# Patient Record
Sex: MALE | Race: BLACK OR AFRICAN AMERICAN | NOT HISPANIC OR LATINO | Employment: STUDENT | URBAN - METROPOLITAN AREA
[De-identification: names, ages, dates, MRNs, and addresses within clinical notes are randomized per-mention and may not be internally consistent; named-entity substitution may affect disease eponyms.]

---

## 2024-08-09 ENCOUNTER — HOSPITAL ENCOUNTER (EMERGENCY)
Facility: HOSPITAL | Age: 19
Discharge: HOME | End: 2024-08-09
Attending: EMERGENCY MEDICINE
Payer: MEDICAID

## 2024-08-09 VITALS
SYSTOLIC BLOOD PRESSURE: 147 MMHG | TEMPERATURE: 97 F | DIASTOLIC BLOOD PRESSURE: 83 MMHG | RESPIRATION RATE: 20 BRPM | HEART RATE: 94 BPM | OXYGEN SATURATION: 98 %

## 2024-08-09 DIAGNOSIS — T78.40XA ALLERGIC REACTION, INITIAL ENCOUNTER: Primary | ICD-10-CM

## 2024-08-09 PROCEDURE — 99291 CRITICAL CARE FIRST HOUR: CPT | Performed by: EMERGENCY MEDICINE

## 2024-08-09 PROCEDURE — 2500000004 HC RX 250 GENERAL PHARMACY W/ HCPCS (ALT 636 FOR OP/ED)

## 2024-08-09 PROCEDURE — 96375 TX/PRO/DX INJ NEW DRUG ADDON: CPT | Performed by: EMERGENCY MEDICINE

## 2024-08-09 PROCEDURE — 2500000004 HC RX 250 GENERAL PHARMACY W/ HCPCS (ALT 636 FOR OP/ED): Performed by: EMERGENCY MEDICINE

## 2024-08-09 PROCEDURE — 96374 THER/PROPH/DIAG INJ IV PUSH: CPT | Performed by: EMERGENCY MEDICINE

## 2024-08-09 RX ORDER — FAMOTIDINE 10 MG/ML
20 INJECTION INTRAVENOUS ONCE
Status: COMPLETED | OUTPATIENT
Start: 2024-08-09 | End: 2024-08-09

## 2024-08-09 RX ORDER — DIPHENHYDRAMINE HYDROCHLORIDE 50 MG/ML
INJECTION INTRAMUSCULAR; INTRAVENOUS
Status: COMPLETED
Start: 2024-08-09 | End: 2024-08-09

## 2024-08-09 RX ORDER — EPINEPHRINE 0.3 MG/.3ML
1 INJECTION INTRAMUSCULAR ONCE AS NEEDED
Qty: 1 EACH | Refills: 0 | Status: SHIPPED | OUTPATIENT
Start: 2024-08-09

## 2024-08-09 RX ORDER — DIPHENHYDRAMINE HYDROCHLORIDE 50 MG/ML
25 INJECTION INTRAMUSCULAR; INTRAVENOUS ONCE
Status: COMPLETED | OUTPATIENT
Start: 2024-08-09 | End: 2024-08-09

## 2024-08-09 RX ORDER — PREDNISONE 20 MG/1
40 TABLET ORAL DAILY
Qty: 8 TABLET | Refills: 0 | Status: SHIPPED | OUTPATIENT
Start: 2024-08-09 | End: 2024-08-13

## 2024-08-09 RX ORDER — EPINEPHRINE 0.3 MG/.3ML
0.3 INJECTION SUBCUTANEOUS ONCE
Status: COMPLETED | OUTPATIENT
Start: 2024-08-09 | End: 2024-08-09

## 2024-08-09 RX ORDER — RISPERIDONE 1 MG/1
2 TABLET ORAL 2 TIMES DAILY
COMMUNITY

## 2024-08-09 RX ORDER — FAMOTIDINE 10 MG/ML
INJECTION INTRAVENOUS
Status: COMPLETED
Start: 2024-08-09 | End: 2024-08-09

## 2024-08-09 RX ORDER — EPINEPHRINE 1 MG/ML
INJECTION INTRAMUSCULAR; INTRAVENOUS; SUBCUTANEOUS
Status: DISCONTINUED
Start: 2024-08-09 | End: 2024-08-09 | Stop reason: HOSPADM

## 2024-08-09 NOTE — ED PROVIDER NOTES
Limitations to History: None  Additional History Obtained from: Family    HPI:    Patient is presenting to the emergency department due to tongue swelling.  Presenting to the ED with his family.  States that approximately at 1130 the patient had development of tongue swelling after eating a peach cobbler and quesadilla.  Denied any nausea, vomiting, chest discomfort.  Denies any difficulty breathing.  Denies any rashes or lesions or abdominal discomfort.  Denies any difficulty swallowing or breathing or difficulty moving his neck.  Has had previous allergic reactions but were unsure of the cause.  Took Benadryl prior to arrival with minimal improvement in his symptoms.  Denies any other exacerbating remitting factors to his symptoms.  Denies any other new exposures that he is aware of.  His review of systems is otherwise negative.    ------------------------------------------------------------------------------------------------------------------------------------------  Physical Exam:    ED Triage Vitals [08/09/24 0139]   Temperature Heart Rate Respirations BP   36.1 °C (97 °F) (!) 126 20 --      Pulse Ox Temp Source Heart Rate Source Patient Position   98 % Temporal -- --      BP Location FiO2 (%)     -- --        VS: As documented in the triage note and EMR flowsheet from this visit were reviewed.  General: Well appearing. No acute distress.   Eyes: Pupils round and reactive. No scleral icterus. No conjunctival injection  HENT: Atraumatic. Normocephalic. Moist mucous membranes. Trachea midline; mild tongue swelling, no lip swelling, no uvula swelling or deviation, no stridor  CV: Tachycardic but regular rhythm, No MRG. No pedal edema appreciated.  Resp: Clear to auscultation bilaterally. Non-labored.    GI: Soft, nontender to palpation. Nondistended. No guarding, rigidity or rebound  Skin: Warm, dry, intact. No systemic rashes or lesions appreciated.  Extremities: No deformities or pain out of proportion; pulses  intact   Neuro: Alert. No focal motor or sensory deficits observed. Speech fluent. Answers questions appropriately.   Psych: Appropriate. Kempt.    ------------------------------------------------------------------------------------------------------------------------------------------    Medical Decision Making  Patient is presenting to the emergency department due to tongue swelling.  Patient states that he had a chicken quesadilla as well as peach cobbler earlier in the evening and approximately an hour later began having tongue swelling.  He states he has had similar symptoms previously, he gave himself a Benadryl with some improvement in his symptoms but due to the continued tongue swelling he came to the emergency department for evaluation.  Denies any, nausea vomiting, shortness of breath, difficulty with moving his neck, difficulty breathing.  Denies increased accretions.  Denies any itching or rash.  On exam the patient is awake and alert, has some mild tongue swelling and tachycardia.  Otherwise well-perfused.  Due to the patient reporting an associated muffled voice will give additional antihistamines, steroids and EpiPen and monitor.  Family at the bedside updated with the plan of care, denied any other new exposures that they were aware of.  Denies use of ACE inhibitor's or other causes of possible angioedema.  Plan to monitor in the ED for continuation of symptoms and reevaluate.  On reevaluation the patient is feeling overall improved.  Heart rates improved.  Has no significant worsening of his swelling, no signs of airway obstruction.  Will discharge the patient home with burst course of steroids as well as recommendations for antihistamines and EpiPen.  Recommend patient follow-up with his family doctor as well as possible allergy for further evaluation.  Patient and family expressed understanding was discharged in stable condition.      External Records Reviewed: I reviewed recent and relevant  outside records including: HIE/Community Record  Escalation of Care: Appropriate for Discharge per ED course/MDM  Social Determinants Affecting Care:Not applicable  Prescription Drug Consideration: Steroids, antihistamines, EpiPen      Objective Data  I have independently interpreted the following labs, imaging studies and MDM added to ED Course  Labs Reviewed - No data to display    No orders to display       ED Course  ED Course as of 08/14/24 1140   Fri Aug 09, 2024   0157 Took benadryl at 11:30, tongue swelling; no stridor feeling improved; no other systemic sx. Ate peach cobbler and chicken quesedilla prior to arrival  [LP]   0323 Reevaluated, feeling improved [LP]      ED Course User Index  [LP] Shawna Franklin DO         Diagnoses as of 08/14/24 1140   Allergic reaction, initial encounter       Procedure  Critical Care    Performed by: Shawna Franklin DO  Authorized by: Shawna Franklin DO    Critical care provider statement:     Critical care time (minutes):  40    Critical care time was exclusive of:  Separately billable procedures and treating other patients and teaching time    Critical care was necessary to treat or prevent imminent or life-threatening deterioration of the following conditions: allergic reaction, facial swelling.    Critical care was time spent personally by me on the following activities:  Blood draw for specimens, evaluation of patient's response to treatment, examination of patient, development of treatment plan with patient or surrogate, ordering and performing treatments and interventions, pulse oximetry, re-evaluation of patient's condition and obtaining history from patient or surrogate      Disposition: discharged    Shawna Franklin DO  Emergency Medicine  Medical Toxicology     Shawna Franklin DO  08/14/24 1140

## 2024-08-09 NOTE — ED TRIAGE NOTES
Pt arrived to ED for tongue swelling. Pt reports eating chicken quesadillas and peach cobbler around 2300. Pt states tongue felt like it began to swell around 0030 and pt took 2 benedryl tabs. Pt states that the benedryl worked a little bit. Pt's voice sounds muffled which is unusual. Pt denies cough or chest tightness, but states tongue is very swollen and throat seems mildly tight.  This is the 2nd time this has happened to patient with unknown source, pt does not have EPI pen at home.

## 2025-04-20 ENCOUNTER — APPOINTMENT (OUTPATIENT)
Dept: RADIOLOGY | Facility: HOSPITAL | Age: 20
End: 2025-04-20
Payer: MEDICAID

## 2025-04-20 ENCOUNTER — HOSPITAL ENCOUNTER (EMERGENCY)
Facility: HOSPITAL | Age: 20
Discharge: HOME | End: 2025-04-20
Payer: MEDICAID

## 2025-04-20 VITALS
BODY MASS INDEX: 31.15 KG/M2 | TEMPERATURE: 97.9 F | SYSTOLIC BLOOD PRESSURE: 105 MMHG | WEIGHT: 230 LBS | RESPIRATION RATE: 20 BRPM | OXYGEN SATURATION: 97 % | HEIGHT: 72 IN | DIASTOLIC BLOOD PRESSURE: 89 MMHG | HEART RATE: 89 BPM

## 2025-04-20 DIAGNOSIS — S61.112A LACERATION OF LEFT THUMB WITHOUT FOREIGN BODY WITH DAMAGE TO NAIL, INITIAL ENCOUNTER: Primary | ICD-10-CM

## 2025-04-20 PROCEDURE — 2500000005 HC RX 250 GENERAL PHARMACY W/O HCPCS

## 2025-04-20 PROCEDURE — 2500000001 HC RX 250 WO HCPCS SELF ADMINISTERED DRUGS (ALT 637 FOR MEDICARE OP)

## 2025-04-20 PROCEDURE — 90715 TDAP VACCINE 7 YRS/> IM: CPT | Mod: JZ

## 2025-04-20 PROCEDURE — 73130 X-RAY EXAM OF HAND: CPT | Mod: LEFT SIDE

## 2025-04-20 PROCEDURE — 12041 INTMD RPR N-HF/GENIT 2.5CM/<: CPT

## 2025-04-20 PROCEDURE — 73130 X-RAY EXAM OF HAND: CPT | Mod: LT

## 2025-04-20 PROCEDURE — 2500000004 HC RX 250 GENERAL PHARMACY W/ HCPCS (ALT 636 FOR OP/ED): Mod: JZ

## 2025-04-20 PROCEDURE — 12001 RPR S/N/AX/GEN/TRNK 2.5CM/<: CPT

## 2025-04-20 PROCEDURE — 90471 IMMUNIZATION ADMIN: CPT

## 2025-04-20 PROCEDURE — 99283 EMERGENCY DEPT VISIT LOW MDM: CPT

## 2025-04-20 RX ORDER — CEPHALEXIN 500 MG/1
500 CAPSULE ORAL ONCE
Status: COMPLETED | OUTPATIENT
Start: 2025-04-20 | End: 2025-04-20

## 2025-04-20 RX ORDER — CEPHALEXIN 500 MG/1
500 CAPSULE ORAL 3 TIMES DAILY
Qty: 15 CAPSULE | Refills: 0 | Status: SHIPPED | OUTPATIENT
Start: 2025-04-20 | End: 2025-04-25

## 2025-04-20 RX ORDER — ACETAMINOPHEN 325 MG/1
975 TABLET ORAL ONCE
Status: COMPLETED | OUTPATIENT
Start: 2025-04-20 | End: 2025-04-20

## 2025-04-20 RX ORDER — BACITRACIN ZINC 500 UNIT/G
OINTMENT IN PACKET (EA) TOPICAL ONCE
Status: COMPLETED | OUTPATIENT
Start: 2025-04-20 | End: 2025-04-20

## 2025-04-20 RX ORDER — LIDOCAINE HYDROCHLORIDE 10 MG/ML
20 INJECTION, SOLUTION INFILTRATION; PERINEURAL ONCE
Status: COMPLETED | OUTPATIENT
Start: 2025-04-20 | End: 2025-04-20

## 2025-04-20 RX ADMIN — BACITRACIN 1 APPLICATION: 500 OINTMENT TOPICAL at 14:59

## 2025-04-20 RX ADMIN — CEPHALEXIN 500 MG: 500 CAPSULE ORAL at 14:59

## 2025-04-20 RX ADMIN — TETANUS TOXOID, REDUCED DIPHTHERIA TOXOID AND ACELLULAR PERTUSSIS VACCINE, ADSORBED 0.5 ML: 5; 2.5; 8; 8; 2.5 SUSPENSION INTRAMUSCULAR at 13:37

## 2025-04-20 RX ADMIN — LIDOCAINE HYDROCHLORIDE 20 ML: 10 INJECTION, SOLUTION INFILTRATION; PERINEURAL at 13:49

## 2025-04-20 RX ADMIN — ACETAMINOPHEN 975 MG: 325 TABLET ORAL at 13:35

## 2025-04-20 ASSESSMENT — COLUMBIA-SUICIDE SEVERITY RATING SCALE - C-SSRS
2. HAVE YOU ACTUALLY HAD ANY THOUGHTS OF KILLING YOURSELF?: NO
1. IN THE PAST MONTH, HAVE YOU WISHED YOU WERE DEAD OR WISHED YOU COULD GO TO SLEEP AND NOT WAKE UP?: NO
6. HAVE YOU EVER DONE ANYTHING, STARTED TO DO ANYTHING, OR PREPARED TO DO ANYTHING TO END YOUR LIFE?: NO

## 2025-04-20 ASSESSMENT — PAIN DESCRIPTION - DESCRIPTORS: DESCRIPTORS: ACHING

## 2025-04-20 ASSESSMENT — PAIN SCALES - GENERAL
PAINLEVEL_OUTOF10: 7
PAINLEVEL_OUTOF10: 7

## 2025-04-20 ASSESSMENT — PAIN - FUNCTIONAL ASSESSMENT: PAIN_FUNCTIONAL_ASSESSMENT: 0-10

## 2025-04-20 ASSESSMENT — PAIN DESCRIPTION - LOCATION: LOCATION: FINGER (COMMENT WHICH ONE)

## 2025-04-20 ASSESSMENT — PAIN DESCRIPTION - ORIENTATION: ORIENTATION: LEFT

## 2025-04-20 NOTE — ED PROVIDER NOTES
HPI   CC: Finger Laceration     Patient is a 19-year-old male PMH prior mental health problems presenting to the ED with concern for a laceration to his left thumb.  Patient states around 1 PM he was opening a can and cut the distal tip of his left thumb.  Patient initially was bleeding however this has stopped.  Immediately after this happened patient ran his finger under cold water and applied pressure.  Last tetanus status is unknown however patient states it is over 5 years old.  He denies any foreign body sensation to his finger and does have full range of motion of his left thumb.  He is left-handed.  He denies any immunocompromise including diabetes, cancer, HIV, steroid use, or chemotherapy.        ROS: 10-point review of systems was performed and is otherwise negative except as noted in HPI.    Limitations to history: N/A  Independent Historians: Self  External Records Reviewed: Outpatient notes in EMR    Past Medical History: Noncontributory except per HPI     Past Surgical History: Noncontributory except per HPI     Family History: Reviewed and noncontributory     Social History:  Denies tobacco. Denies ETOH. Denies illicit drugs.    RX Allergies[1]    Home Meds:   Current Outpatient Medications   Medication Instructions    EpiPen 2-Zeke 0.3 mg, intramuscular, Once as needed, Inject into upper leg. Call 911 after use.    risperiDONE (RISPERDAL) 2 mg, oral, 2 times daily        Physical Exam     ED Triage Vitals [04/20/25 1303]   Temperature Heart Rate Respirations BP   36.6 °C (97.9 °F) 89 20 105/89      Pulse Ox Temp Source Heart Rate Source Patient Position   97 % Temporal -- --      BP Location FiO2 (%)     -- --         Vitals and nursing note reviewed.   Physical Exam  Constitutional:       General: He is not in acute distress.     Appearance: Normal appearance. He is not ill-appearing, toxic-appearing or diaphoretic.   HENT:      Head: Normocephalic and atraumatic.   Cardiovascular:      Rate and  Rhythm: Normal rate and regular rhythm.      Heart sounds: Normal heart sounds. No murmur heard.     No friction rub. No gallop.   Pulmonary:      Effort: Pulmonary effort is normal. No respiratory distress.      Breath sounds: Normal breath sounds. No stridor. No wheezing, rhonchi or rales.   Musculoskeletal:         General: Normal range of motion.      Comments: Rom full to finger flexion/extension and thumb opposition   Skin:     General: Skin is warm and dry.      Capillary Refill: Capillary refill takes less than 2 seconds.      Comments: 2.5 cm laceration to distal left tip of thumb with damage to nail as in photo below   Neurological:      General: No focal deficit present.      Mental Status: He is alert and oriented to person, place, and time.      Comments:  strength 5/5. Pt has intact sensation over thenar muscles.     Radial nerve: intact  Median nerve: intact  Ulnar nerve: intact   Psychiatric:         Mood and Affect: Mood normal.         Behavior: Behavior normal.               Diagnostic Results      Labs Reviewed - No data to display      XR hand left 3+ views    (Results Pending)       ED Course & MDM   Assessment/Plan:   Medications   acetaminophen (Tylenol) tablet 975 mg (has no administration in time range)      ED Course as of 04/20/25 1506   Sun Apr 20, 2025   1332 Patient is a 19-year-old male presented ED with concern for a left thumb laceration.  Vital signs are stable, patient nontoxic-appearing.  On exam he does have full range of motion of his left thumb.  The radial, median, and ulnar nerve are intact in the left hand.  Bleeding is controlled.  He denies immunocompromise.  Laceration does go through the nailbed.  Will obtain x-ray left hand for further evaluation.  Will update tetanus as last dose is unknown but patient states it is at least over 5 years old.  Will give Tylenol for pain control. [VS]   1343 I personally reviewed x-ray imaging.  No obvious fracture or dislocation to  distal phalanx of left thumb.  Will await radiologist read. [VS]   1502 X-ray negative for any acute fracture or dislocation or foreign body.  Wound was inspected good lighting and no foreign body was seen.  Hemostasis was achieved with direct pressure.  The thumb was soaked in dilute Betadine and then was cleaned with poviodine swabs and was irrigated with normal saline.  Wound was repaired using 6 5-0 Prolene sutures.  Patient tolerated procedure well.  Bacitracin was applied with sterile dressing.  Given laceration was through the nailbed consider removing nail.  Patient was not interested in having his nail removed.  Discussed with other [facility] working Paulie Magallanes who recommended placing sutures through cracked distal tip of nail.  1 suture was placed through nail.  Given nail involvement patient was started on Keflex 500 mg for 5 days.  Patient educated on wound care and told to return in 7-10 days for suture removal.  Patient told to return to ED for any new or worsening symptoms. [VS]      ED Course User Index  [VS] Arlin Ramos PA-C         Diagnoses as of 04/20/25 1506   Laceration of left thumb without foreign body with damage to nail, initial encounter       ED Prescriptions    None         Chronic Medical Conditions Significantly Affecting Care:      Escalation of Care: Appropriate for outpatient management    Counseling: Spoke with the patient and discussed today´s findings, in addition to providing specific details for the plan of care and expected course.  Patient was given the opportunity to ask questions.    Discussed return precautions and importance of follow-up.  Advised to follow-up with Any urgent care, ED, or doctors office.  Advised to return to the ED for changing or worsening symptoms, new symptoms, complaint specific precautions, and precautions listed on the discharge paperwork.  Educated on the common potential side effects of medications prescribed.    I advised the patient that  the emergency evaluation and treatment provided today doesn't end their need for medical care. It is very important that they follow-up with their primary care provider or other specialist as instructed.    The plan of care was mutually agreed upon with the patient. The patient and/or family were given the opportunity to ask questions. All questions asked today in the ED were answered to the best of my ability with today's information.    I specifically advised the patient to return to the ED for changing or worsening symptoms, worrisome new symptoms, or for any complaint specific precautions listed on the discharge paperwork.    Procedure  Laceration Repair    Performed by: Arlin Ramos PA-C  Authorized by: Arlin Ramos PA-C    Consent:     Consent obtained:  Verbal    Consent given by:  Patient    Risks, benefits, and alternatives were discussed: yes      Risks discussed:  Infection, nerve damage, need for additional repair, pain, poor cosmetic result, poor wound healing, vascular damage, tendon damage and retained foreign body    Alternatives discussed:  Referral, delayed treatment, observation and no treatment  Universal protocol:     Procedure explained and questions answered to patient or proxy's satisfaction: yes      Imaging studies available: yes      Site/side marked: yes      Immediately prior to procedure, a time out was called: yes      Patient identity confirmed:  Verbally with patient and arm band  Anesthesia:     Anesthesia method:  Local infiltration    Local anesthetic:  Lidocaine 1% w/o epi  Laceration details:     Location:  Finger    Finger location:  L thumb    Length (cm):  2.5    Depth (mm):  22  Pre-procedure details:     Preparation:  Patient was prepped and draped in usual sterile fashion  Exploration:     Hemostasis achieved with:  Direct pressure    Imaging obtained: x-ray      Imaging outcome: foreign body not noted      Wound exploration: wound explored through  full range of motion and entire depth of wound visualized    Treatment:     Area cleansed with:  Povidone-iodine    Amount of cleaning:  Standard    Irrigation solution:  Sterile saline    Irrigation method:  Pressure wash  Skin repair:     Repair method:  Sutures    Suture size:  5-0    Suture material:  Prolene    Suture technique:  Simple interrupted    Number of sutures:  6  Approximation:     Approximation:  Close  Repair type:     Repair type:  Intermediate  Post-procedure details:     Dressing:  Antibiotic ointment and sterile dressing    Procedure completion:  Tolerated           [1] No Known Allergies       Arlin Ramos PA-C  04/20/25 1503

## 2025-04-20 NOTE — DISCHARGE INSTRUCTIONS
Take cephalexin 500 mg 3 times a day for 5 days  Please return to any urgent care or ED to have sutures removed in 7-10 days  Avoid getting wound wet for 24-48 hours  Avoid hot tubs, pools, and lakes until sutures are taken out  Clean area with soap and water and change dressing 1-2 times per day  Monitor for signs of infection including but not limited to swelling, redness, or warmth to area or fever/chills  After sutures are removed, to reduce potential scarring apply sunscreen to area and avoid tanning for 1 year. Can apply vitamin E oil to area  Return to ED for any new or worsening symptoms

## 2025-04-26 ENCOUNTER — APPOINTMENT (OUTPATIENT)
Dept: CARDIOLOGY | Facility: HOSPITAL | Age: 20
End: 2025-04-26
Payer: MEDICAID

## 2025-04-26 ENCOUNTER — HOSPITAL ENCOUNTER (EMERGENCY)
Facility: HOSPITAL | Age: 20
Discharge: HOME | End: 2025-04-26
Attending: EMERGENCY MEDICINE
Payer: MEDICAID

## 2025-04-26 ENCOUNTER — APPOINTMENT (OUTPATIENT)
Dept: RADIOLOGY | Facility: HOSPITAL | Age: 20
End: 2025-04-26
Payer: MEDICAID

## 2025-04-26 VITALS
SYSTOLIC BLOOD PRESSURE: 141 MMHG | TEMPERATURE: 98.2 F | HEIGHT: 72 IN | DIASTOLIC BLOOD PRESSURE: 86 MMHG | BODY MASS INDEX: 31.15 KG/M2 | WEIGHT: 230 LBS | OXYGEN SATURATION: 100 % | HEART RATE: 78 BPM | RESPIRATION RATE: 18 BRPM

## 2025-04-26 DIAGNOSIS — R07.9 CHEST PAIN, UNSPECIFIED TYPE: Primary | ICD-10-CM

## 2025-04-26 LAB
ANION GAP SERPL CALC-SCNC: 14 MMOL/L (ref 10–20)
BASOPHILS # BLD AUTO: 0.06 X10*3/UL (ref 0–0.1)
BASOPHILS NFR BLD AUTO: 0.9 %
BUN SERPL-MCNC: 10 MG/DL (ref 6–23)
CALCIUM SERPL-MCNC: 9.4 MG/DL (ref 8.6–10.3)
CARDIAC TROPONIN I PNL SERPL HS: 9 NG/L (ref 0–20)
CHLORIDE SERPL-SCNC: 107 MMOL/L (ref 98–107)
CO2 SERPL-SCNC: 20 MMOL/L (ref 21–32)
CREAT SERPL-MCNC: 0.97 MG/DL (ref 0.5–1.3)
D DIMER PPP FEU-MCNC: <215 NG/ML FEU
EGFRCR SERPLBLD CKD-EPI 2021: >90 ML/MIN/1.73M*2
EOSINOPHIL # BLD AUTO: 0.25 X10*3/UL (ref 0–0.7)
EOSINOPHIL NFR BLD AUTO: 3.9 %
ERYTHROCYTE [DISTWIDTH] IN BLOOD BY AUTOMATED COUNT: 12.5 % (ref 11.5–14.5)
GLUCOSE SERPL-MCNC: 94 MG/DL (ref 74–99)
HCT VFR BLD AUTO: 44.9 % (ref 41–52)
HGB BLD-MCNC: 15 G/DL (ref 13.5–17.5)
IMM GRANULOCYTES # BLD AUTO: 0.01 X10*3/UL (ref 0–0.7)
IMM GRANULOCYTES NFR BLD AUTO: 0.2 % (ref 0–0.9)
LYMPHOCYTES # BLD AUTO: 2.94 X10*3/UL (ref 1.2–4.8)
LYMPHOCYTES NFR BLD AUTO: 46.2 %
MCH RBC QN AUTO: 27 PG (ref 26–34)
MCHC RBC AUTO-ENTMCNC: 33.4 G/DL (ref 32–36)
MCV RBC AUTO: 81 FL (ref 80–100)
MONOCYTES # BLD AUTO: 0.36 X10*3/UL (ref 0.1–1)
MONOCYTES NFR BLD AUTO: 5.7 %
NEUTROPHILS # BLD AUTO: 2.75 X10*3/UL (ref 1.2–7.7)
NEUTROPHILS NFR BLD AUTO: 43.1 %
NRBC BLD-RTO: 0 /100 WBCS (ref 0–0)
PLATELET # BLD AUTO: 292 X10*3/UL (ref 150–450)
POTASSIUM SERPL-SCNC: 3.9 MMOL/L (ref 3.5–5.3)
RBC # BLD AUTO: 5.55 X10*6/UL (ref 4.5–5.9)
SODIUM SERPL-SCNC: 137 MMOL/L (ref 136–145)
WBC # BLD AUTO: 6.4 X10*3/UL (ref 4.4–11.3)

## 2025-04-26 PROCEDURE — 84484 ASSAY OF TROPONIN QUANT: CPT | Performed by: EMERGENCY MEDICINE

## 2025-04-26 PROCEDURE — 71045 X-RAY EXAM CHEST 1 VIEW: CPT

## 2025-04-26 PROCEDURE — 82374 ASSAY BLOOD CARBON DIOXIDE: CPT | Performed by: EMERGENCY MEDICINE

## 2025-04-26 PROCEDURE — 71045 X-RAY EXAM CHEST 1 VIEW: CPT | Performed by: RADIOLOGY

## 2025-04-26 PROCEDURE — 93005 ELECTROCARDIOGRAM TRACING: CPT

## 2025-04-26 PROCEDURE — 99285 EMERGENCY DEPT VISIT HI MDM: CPT | Performed by: EMERGENCY MEDICINE

## 2025-04-26 PROCEDURE — 36415 COLL VENOUS BLD VENIPUNCTURE: CPT | Performed by: EMERGENCY MEDICINE

## 2025-04-26 PROCEDURE — 85025 COMPLETE CBC W/AUTO DIFF WBC: CPT | Performed by: EMERGENCY MEDICINE

## 2025-04-26 PROCEDURE — 85379 FIBRIN DEGRADATION QUANT: CPT | Performed by: EMERGENCY MEDICINE

## 2025-04-26 ASSESSMENT — PAIN DESCRIPTION - PAIN TYPE: TYPE: ACUTE PAIN

## 2025-04-26 ASSESSMENT — COLUMBIA-SUICIDE SEVERITY RATING SCALE - C-SSRS
1. IN THE PAST MONTH, HAVE YOU WISHED YOU WERE DEAD OR WISHED YOU COULD GO TO SLEEP AND NOT WAKE UP?: NO
6. HAVE YOU EVER DONE ANYTHING, STARTED TO DO ANYTHING, OR PREPARED TO DO ANYTHING TO END YOUR LIFE?: NO
2. HAVE YOU ACTUALLY HAD ANY THOUGHTS OF KILLING YOURSELF?: NO

## 2025-04-26 ASSESSMENT — PAIN SCALES - GENERAL: PAINLEVEL_OUTOF10: 0 - NO PAIN

## 2025-04-26 ASSESSMENT — PAIN DESCRIPTION - LOCATION: LOCATION: CHEST

## 2025-04-26 ASSESSMENT — PAIN - FUNCTIONAL ASSESSMENT: PAIN_FUNCTIONAL_ASSESSMENT: 0-10

## 2025-04-26 ASSESSMENT — PAIN DESCRIPTION - FREQUENCY: FREQUENCY: CONSTANT/CONTINUOUS

## 2025-04-26 ASSESSMENT — PAIN DESCRIPTION - ORIENTATION: ORIENTATION: LOWER;MID

## 2025-04-26 ASSESSMENT — PAIN DESCRIPTION - DESCRIPTORS: DESCRIPTORS: BURNING

## 2025-04-26 NOTE — ED PROVIDER NOTES
HPI   Chief Complaint   Patient presents with    Shortness of Breath     Patient reports sitting around the day after easter and had sudden onset SOB and CP. Denies cough, congestion, nausea or vomiting. Patient too milk of mag last night with some relief.    Wrist Pain     Patient states he was working out when he noticed it. Patient denies any pain or known injury        This is a 19-year-old who presents to the emergency department complaining of chest pain.  The patient states that chest pain started the day after Easter, 4/21.  He he suffered a laceration to his left thumb.  He was treated.  The next day he developed right-sided chest pain.  It has progressively improved over the course of the week.  He reports occasional shortness of breath.  He denies cough.  Denies fever.  Denies chills.  He denies nausea and vomiting.                  Patient History   Medical History[1]  Surgical History[2]  Family History[3]  Social History[4]    Physical Exam   ED Triage Vitals [04/26/25 1326]   Temperature Heart Rate Respirations BP   36.8 °C (98.2 °F) 91 16 164/83      Pulse Ox Temp Source Heart Rate Source Patient Position   99 % Oral Monitor Sitting      BP Location FiO2 (%)     Left arm --       Physical Exam  Vitals and nursing note reviewed.   HENT:      Head: Normocephalic and atraumatic.      Nose: Nose normal.   Eyes:      Conjunctiva/sclera: Conjunctivae normal.   Cardiovascular:      Rate and Rhythm: Normal rate and regular rhythm.      Pulses: Normal pulses.      Heart sounds: Normal heart sounds.   Pulmonary:      Effort: Pulmonary effort is normal.      Breath sounds: Normal breath sounds.   Abdominal:      General: Bowel sounds are normal.      Palpations: Abdomen is soft.   Musculoskeletal:         General: Normal range of motion.      Cervical back: Normal range of motion and neck supple.   Skin:     Findings: No rash.      Comments: Dressing on the left thumb.   Neurological:      General: No focal  deficit present.      Mental Status: He is alert and oriented to person, place, and time.   Psychiatric:         Mood and Affect: Mood normal.           ED Course & MDM   Diagnoses as of 04/27/25 0809   Chest pain, unspecified type                 No data recorded     Heidi Coma Scale Score: 15 (04/26/25 1327 : Cassie Arguello RN)                           Medical Decision Making  Differential diagnosis: I have considered the following conditions in my assessment of this patient's condition:  GERD, musculoskeletal chest pain, aortic dissection, cholecystitis, ACS, pericarditis, myocarditis, tamponade, shingles,  pneumonia, pneumothorax, pulmonary embolus.    This is a 19-year-old male who presents to the emergency department complaining of chest pain and shortness of breath.  EKG is unremarkable.  He will be further evaluated with labs and chest x-ray.  The patient's x-ray was normal.  Troponin was negative despite a week of symptoms.  This is not consistent with ACS.  D-dimer was negative and there was low pretest probability.  This rules out PE.  The patient's risk for cardiac disease is low.  He is appropriate for outpatient follow-up.    Amount and/or Complexity of Data Reviewed  ECG/medicine tests: independent interpretation performed.     Details: Normal sinus rhythm, heart rate 81, normal axis, no ST elevation.        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    Otitis media, unspecified, unspecified ear     Chronic otitis media    Personal history of other mental and behavioral disorders 11/14/2017    History of attention deficit hyperactivity disorder (ADHD)    Snoring     Snoring    Unspecified lack  of expected normal physiological development in childhood     Developmental delay    Unspecified mood (affective) disorder ([facility]) 01/17/2019    Mood disorder   [2]   Past Surgical History:  Procedure Laterality Date    ADENOIDECTOMY  09/17/2017    Adenoidectomy    CIRCUMCISION, PRIMARY  02/16/2016    Elective Circumcision    OTHER SURGICAL HISTORY  12/10/2013    Ear Pressure Equalization Tube, Insertion    OTHER SURGICAL HISTORY  05/24/2020    Tonsillectomy with adenoidectomy    OTHER SURGICAL HISTORY  09/17/2017    Treatment Of The Left Foot    OTHER SURGICAL HISTORY  09/17/2017    Treatment Of The Right Foot   [3] No family history on file.  [4]   Social History  Tobacco Use    Smoking status: Not on file    Smokeless tobacco: Not on file   Substance Use Topics    Alcohol use: Not on file    Drug use: Not on file        John Singh MD  04/27/25 0810

## 2025-04-28 LAB
ATRIAL RATE: 81 BPM
P AXIS: 66 DEGREES
P OFFSET: 205 MS
P ONSET: 153 MS
PR INTERVAL: 142 MS
Q ONSET: 224 MS
QRS COUNT: 13 BEATS
QRS DURATION: 86 MS
QT INTERVAL: 360 MS
QTC CALCULATION(BAZETT): 418 MS
QTC FREDERICIA: 397 MS
R AXIS: 74 DEGREES
T AXIS: 21 DEGREES
T OFFSET: 404 MS
VENTRICULAR RATE: 81 BPM

## 2025-05-01 ENCOUNTER — HOSPITAL ENCOUNTER (EMERGENCY)
Facility: HOSPITAL | Age: 20
Discharge: HOME | End: 2025-05-01
Payer: MEDICAID

## 2025-05-01 VITALS
DIASTOLIC BLOOD PRESSURE: 84 MMHG | SYSTOLIC BLOOD PRESSURE: 139 MMHG | TEMPERATURE: 97.9 F | OXYGEN SATURATION: 100 % | RESPIRATION RATE: 18 BRPM | HEART RATE: 76 BPM | BODY MASS INDEX: 31.15 KG/M2 | WEIGHT: 230 LBS | HEIGHT: 72 IN

## 2025-05-01 DIAGNOSIS — Z48.02 VISIT FOR SUTURE REMOVAL: Primary | ICD-10-CM

## 2025-05-01 PROCEDURE — 99281 EMR DPT VST MAYX REQ PHY/QHP: CPT

## 2025-05-01 ASSESSMENT — PAIN - FUNCTIONAL ASSESSMENT: PAIN_FUNCTIONAL_ASSESSMENT: 0-10

## 2025-05-01 ASSESSMENT — PAIN SCALES - GENERAL: PAINLEVEL_OUTOF10: 0 - NO PAIN

## 2025-05-01 NOTE — ED PROVIDER NOTES
HPI   CC: Suture / Staple Removal     Patient is a 19-year-old male PMH prior mental health problems presenting to the ED with concern for suture removal.  I placed sutures on this patient on 4/20/25 to the left thumb and patient is presenting today for suture removal.  He denies any redness, warmth, swelling, discharge, fevers.  He reports no problem with sutures.  He has no other concerns today.          ROS: 10-point review of systems was performed and is otherwise negative except as noted in HPI.    Limitations to history: N/A  Independent Historians: Self  External Records Reviewed: Outpatient notes in EMR    Past Medical History: Noncontributory except per HPI     Past Surgical History: Noncontributory except per HPI     Family History: Reviewed and noncontributory     Social History:  Denies tobacco. Denies ETOH. Denies illicit drugs.    RX Allergies[1]    Home Meds:   Current Outpatient Medications   Medication Instructions    EpiPen 2-Zeke 0.3 mg, intramuscular, Once as needed, Inject into upper leg. Call 911 after use.    risperiDONE (RISPERDAL) 2 mg, oral, 2 times daily        Physical Exam     ED Triage Vitals [05/01/25 1126]   Temperature Heart Rate Respirations BP   36.6 °C (97.9 °F) 76 18 139/84      Pulse Ox Temp Source Heart Rate Source Patient Position   100 % Temporal Monitor Sitting      BP Location FiO2 (%)     Left arm --         Vitals and nursing note reviewed.   Physical Exam  Constitutional:       General: He is not in acute distress.     Appearance: Normal appearance. He is not ill-appearing, toxic-appearing or diaphoretic.   Skin:     General: Skin is warm and dry.      Comments: Healed laceration with 6 sutures to distal tip of left thumb.  There is no surrounding warmth, erythema, swelling, purulent discharge.   Neurological:      Mental Status: He is alert.         Diagnostic Results      Labs Reviewed - No data to display      No orders to display       ED Course & MDM    Assessment/Plan:   Medications - No data to display   ED Course as of 05/01/25 1142   Thu May 01, 2025   1140 Patient is a 19-year-old male presenting the ED with concern for suture removal.  Vital signs are stable, patient is nontoxic-appearing.  There is no signs of laceration infection.  Sutures were removed with no complications.  Recommend follow-up PCP as needed for any additional concerns.  Patient told to return to ED for any new or worsening symptoms. [VS]      ED Course User Index  [VS] Arlin Ramos PA-C         Diagnoses as of 05/01/25 1142   Visit for suture removal       ED Prescriptions    None         Chronic Medical Conditions Significantly Affecting Care:      Escalation of Care: Appropriate for outpatient management  Counseling: Spoke with the patient and discussed today´s findings, in addition to providing specific details for the plan of care and expected course.  Patient was given the opportunity to ask questions.    Discussed return precautions and importance of follow-up.  Advised to follow-up with PCP.  Advised to return to the ED for changing or worsening symptoms, new symptoms, complaint specific precautions, and precautions listed on the discharge paperwork.    I advised the patient that the emergency evaluation and treatment provided today doesn't end their need for medical care. It is very important that they follow-up with their primary care provider or other specialist as instructed.    The plan of care was mutually agreed upon with the patient. The patient and/or family were given the opportunity to ask questions. All questions asked today in the ED were answered to the best of my ability with today's information.    I specifically advised the patient to return to the ED for changing or worsening symptoms, worrisome new symptoms, or for any complaint specific precautions listed on the discharge paperwork.    Procedure  Suture Removal    Performed by: Arlin GALLEGO  DON Ramos  Authorized by: Arlin Ramos PA-C    Consent:     Consent obtained:  Verbal    Consent given by:  Patient    Risks discussed:  Bleeding, pain and wound separation    Alternatives discussed:  No treatment, delayed treatment, alternative treatment, observation and referral  Universal protocol:     Procedure explained and questions answered to patient or proxy's satisfaction: yes      Relevant documents present and verified: yes      Site/side marked: yes      Immediately prior to procedure, a time out was called: yes      Patient identity confirmed:  Verbally with patient and arm band  Location:     Location:  Upper extremity    Upper extremity location:  Hand    Hand location:  L thumb  Procedure details:     Wound appearance:  No signs of infection and clean    Number of sutures removed:  6  Post-procedure details:     Post-removal:  No dressing applied    Procedure completion:  Tolerated           [1] No Known Allergies       Arlin Ramos PA-C  05/01/25 1143

## 2025-05-05 ENCOUNTER — HOSPITAL ENCOUNTER (EMERGENCY)
Facility: HOSPITAL | Age: 20
Discharge: HOME | End: 2025-05-05
Payer: MEDICAID

## 2025-05-05 ENCOUNTER — APPOINTMENT (OUTPATIENT)
Dept: RADIOLOGY | Facility: HOSPITAL | Age: 20
End: 2025-05-05
Payer: MEDICAID

## 2025-05-05 VITALS
BODY MASS INDEX: 31.15 KG/M2 | HEIGHT: 72 IN | OXYGEN SATURATION: 100 % | HEART RATE: 80 BPM | WEIGHT: 230 LBS | RESPIRATION RATE: 18 BRPM | TEMPERATURE: 97.9 F | DIASTOLIC BLOOD PRESSURE: 97 MMHG | SYSTOLIC BLOOD PRESSURE: 158 MMHG

## 2025-05-05 DIAGNOSIS — R06.02 SOB (SHORTNESS OF BREATH): Primary | ICD-10-CM

## 2025-05-05 PROCEDURE — 99283 EMERGENCY DEPT VISIT LOW MDM: CPT | Mod: 25

## 2025-05-05 PROCEDURE — 71045 X-RAY EXAM CHEST 1 VIEW: CPT

## 2025-05-05 PROCEDURE — 71045 X-RAY EXAM CHEST 1 VIEW: CPT | Performed by: RADIOLOGY

## 2025-05-05 SDOH — HEALTH STABILITY: MENTAL HEALTH: BEHAVIORAL HEALTH(WDL): WITHIN DEFINED LIMITS

## 2025-05-05 ASSESSMENT — PAIN - FUNCTIONAL ASSESSMENT: PAIN_FUNCTIONAL_ASSESSMENT: 0-10

## 2025-05-05 ASSESSMENT — PAIN SCALES - GENERAL: PAINLEVEL_OUTOF10: 0 - NO PAIN

## 2025-05-05 ASSESSMENT — PAIN DESCRIPTION - PROGRESSION: CLINICAL_PROGRESSION: RESOLVED

## 2025-05-05 NOTE — ED PROVIDER NOTES
Chief Complaint   Patient presents with    Anxiety     HPI:   Clementine Castro is an 19 y.o. male presenting to the ED for chief complaint of shortness of breath.  Patient explains that this evening he was at work and he had an uncomfortable awareness of the bones in his chest.  He explains that he noticed his collarbone on the right side and felt that it was sticking out so he presented to the medical department at his place of employment which is [facility], and was told that what he was feeling was a normal collarbone.  Patient explains that he went back to work and became anxious and was experiencing increasingly worsening shortness of breath.  Denies any fever chills sweats cough or chest pain.  No abdominal pain NVD.    RX Allergies[1]:  Medical History[2]  Surgical History[3]  Family History[4]     Physical Exam  Vitals and nursing note reviewed.   Constitutional:       General: He is not in acute distress.     Appearance: He is well-developed.   HENT:      Head: Normocephalic and atraumatic.      Right Ear: Tympanic membrane and external ear normal.      Left Ear: Tympanic membrane and external ear normal.      Nose: Nose normal.      Mouth/Throat:      Mouth: Mucous membranes are moist.      Pharynx: Oropharynx is clear.   Eyes:      Extraocular Movements: Extraocular movements intact.      Conjunctiva/sclera: Conjunctivae normal.      Pupils: Pupils are equal, round, and reactive to light.   Cardiovascular:      Rate and Rhythm: Normal rate and regular rhythm.      Heart sounds: No murmur heard.  Pulmonary:      Effort: Pulmonary effort is normal. No respiratory distress.      Breath sounds: Normal breath sounds.   Abdominal:      Palpations: Abdomen is soft.      Tenderness: There is no abdominal tenderness.   Musculoskeletal:         General: No swelling.      Cervical back: Neck supple.   Skin:     General: Skin is warm and dry.      Capillary Refill: Capillary refill takes less than 2 seconds.    Neurological:      Mental Status: He is alert.   Psychiatric:         Mood and Affect: Mood normal.        VS: As documented in the triage note and EMR flowsheet from this visit were reviewed.    Medical Decision Making: This is a 19-year-old male presenting to the ED for a complaint of shortness of breath.  Patient explains earlier today he became uncomfortably aware of his collarbone at his place of work causing him to experience anxiety and shortness of breath.  On my evaluation the patient was in no acute distress.  He is very well-appearing.  His vitals are stable.  His collarbone was in place with no deformity or step-offs or tenderness.  His lungs were clear to auscultation.  He is moving all 4 limbs spontaneously.  He had no injury or fall of low suspicion for fracture.  His chest x-ray was reassuring.  Patient was reassured after our discussion that his collarbone was normal.  He understands return precautions.  He denies SI HI.  He is appropriate for outpatient management.    Diagnoses as of 05/05/25 0237   SOB (shortness of breath)     Counseling: Spoke with the patient and discussed today´s findings, in addition to providing specific details for the plan of care and expected course.  Patient was given the opportunity to ask questions.    Discussed return precautions and importance of follow-up.  Advised to follow-up with PCP.  I specifically advised to return to the ED for changing or worsening symptoms, new symptoms, complaint specific precautions, and precautions listed on the discharge paperwork.  Educated on the common potential side effects of medications prescribed.    I advised the patient that the emergency evaluation and treatment provided today doesn't end their need for medical care. It is very important that they follow-up with their primary care provider or other specialist as instructed.    The plan of care was mutually agreed upon with the patient. The patient and/or family were given  the opportunity to ask questions. All questions asked today in the ED were answered to the best of my ability with today's information.    This report was transcribed using voice recognition software.  Every effort was made to ensure accuracy, however, inadvertently computerized transcription errors may be present.         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    Otitis media, unspecified, unspecified ear     Chronic otitis media    Personal history of other mental and behavioral disorders 11/14/2017    History of attention deficit hyperactivity disorder (ADHD)    Snoring     Snoring    Unspecified lack of expected normal physiological development in childhood     Developmental delay    Unspecified mood (affective) disorder (CMS-[facility]) 01/17/2019    Mood disorder   [3]   Past Surgical History:  Procedure Laterality Date    ADENOIDECTOMY  09/17/2017    Adenoidectomy    CIRCUMCISION, PRIMARY  02/16/2016    Elective Circumcision    OTHER SURGICAL HISTORY  12/10/2013    Ear Pressure Equalization Tube, Insertion    OTHER SURGICAL HISTORY  05/24/2020    Tonsillectomy with adenoidectomy    OTHER SURGICAL HISTORY  09/17/2017    Treatment Of The Left Foot    OTHER SURGICAL HISTORY  09/17/2017    Treatment Of The Right Foot   [4] No family history on file.       Silvio Cleveland PA-C  05/05/25 0359

## 2025-05-05 NOTE — Clinical Note
Clementine Castro was seen and treated in our emergency department on 5/5/2025.  He may return to work on 05/06/2025.       If you have any questions or concerns, please don't hesitate to call.      Silvio Cleveland PA-C

## 2025-05-05 NOTE — DISCHARGE INSTRUCTIONS
You have been seen at a [facility].  Please follow-up with your primary care provider in the next 1 to 2 days for further evaluation and routine follow-up.  Please return to the emergency room if having any worsening symptoms.  Please follow-up with any specialists if discussed during your emergency room stay.

## 2025-05-05 NOTE — ED TRIAGE NOTES
"Pt reports to the ED after leaving work because he \"felt like the bones in his upper chest were suddenly sticking out\". Pt works at [employer], went to the medical department and was told that what he was feeling was his normal collar bone. Pt went back to work, became anxious and told them he \"couldn't work like this\" and left.  "

## 2025-05-09 ENCOUNTER — APPOINTMENT (OUTPATIENT)
Dept: PRIMARY CARE | Facility: CLINIC | Age: 20
End: 2025-05-09
Payer: MEDICAID

## 2025-05-14 ENCOUNTER — APPOINTMENT (OUTPATIENT)
Dept: PRIMARY CARE | Facility: CLINIC | Age: 20
End: 2025-05-14
Payer: MEDICAID

## 2025-05-14 VITALS
HEART RATE: 82 BPM | DIASTOLIC BLOOD PRESSURE: 83 MMHG | HEIGHT: 72 IN | SYSTOLIC BLOOD PRESSURE: 139 MMHG | BODY MASS INDEX: 30.2 KG/M2 | WEIGHT: 223 LBS | OXYGEN SATURATION: 96 % | TEMPERATURE: 97.1 F

## 2025-05-14 DIAGNOSIS — F22 PARANOIA (MULTI): ICD-10-CM

## 2025-05-14 DIAGNOSIS — M54.2 NECK PAIN: Primary | ICD-10-CM

## 2025-05-14 PROBLEM — F29 PSYCHOSIS (MULTI): Status: ACTIVE | Noted: 2025-05-14

## 2025-05-14 PROCEDURE — 99214 OFFICE O/P EST MOD 30 MIN: CPT | Performed by: NURSE PRACTITIONER

## 2025-05-14 PROCEDURE — 1036F TOBACCO NON-USER: CPT | Performed by: NURSE PRACTITIONER

## 2025-05-14 PROCEDURE — 3008F BODY MASS INDEX DOCD: CPT | Performed by: NURSE PRACTITIONER

## 2025-05-14 RX ORDER — FLUTICASONE PROPIONATE 50 MCG
SPRAY, SUSPENSION (ML) NASAL
COMMUNITY
Start: 2016-01-27

## 2025-05-14 RX ORDER — CETIRIZINE HYDROCHLORIDE 10 MG/1
1 TABLET ORAL DAILY PRN
COMMUNITY
Start: 2019-01-16

## 2025-05-14 ASSESSMENT — ENCOUNTER SYMPTOMS
CONSTITUTIONAL NEGATIVE: 1
RESPIRATORY NEGATIVE: 1
CARDIOVASCULAR NEGATIVE: 1
NERVOUS/ANXIOUS: 1
NECK PAIN: 1
CONSTIPATION: 0
DEPRESSION: 0

## 2025-05-14 ASSESSMENT — PAIN SCALES - GENERAL: PAINLEVEL_OUTOF10: 3

## 2025-05-14 NOTE — PATIENT INSTRUCTIONS
Please see mental health and follow-up with me in 2-3 months.  Continue medications as prescribed.  Call office any concerns.

## 2025-05-14 NOTE — PROGRESS NOTES
Subjective   Patient ID: Clementine Castro is a 19 y.o. male who presents for Establish Care (Complains of neck pain.).    HPI     Patient presents to clinic accompanied by his mother to establish care.    Patient with past medical history of paranoia, psychosis, intermittent explosive disorder.      Today patient presents complaining of neck pain.  He denies trauma or injury to his neck.  He states he feels as though something is sticking out of his neck when he moves it in certain positions.    His mother states he has been to the ER multiple times for several different complaints and workup is always negative.  Patient was recently seen at [facility] 5/5/2025 for feeling short of breath diagnosed with anxiety.    She states every day he complains of a different pain.  She states he works at [employer] in the evenings but every day he has a different complaint and wants to go to the emergency department.  She states he always feels as though he is dying.    Patient is being followed by psychiatrist at [facility] monthly.  He has an upcoming appointment May 24, 2025.  Patient is not being followed by a therapist but mother states she will assist him in seeing a therapist regularly.        Review of Systems   Constitutional: Negative.    HENT: Negative.     Respiratory: Negative.     Cardiovascular: Negative.    Gastrointestinal:  Negative for constipation.   Musculoskeletal:  Positive for neck pain.   Skin: Negative.    Psychiatric/Behavioral:  The patient is nervous/anxious.         See HPI       Objective   /83 (BP Location: Right arm, Patient Position: Sitting, BP Cuff Size: Adult)   Pulse 82   Temp 36.2 °C (97.1 °F) (Temporal)   Ht 1.829 m (6')   Wt 101 kg (223 lb)   SpO2 96%   BMI 30.24 kg/m²     Physical Exam  Vitals reviewed.   Constitutional:       General: He is not in acute distress.     Appearance: Normal appearance. He is not ill-appearing.   HENT:      Mouth/Throat:      Mouth:  Mucous membranes are moist.      Pharynx: Oropharynx is clear.   Cardiovascular:      Rate and Rhythm: Normal rate and regular rhythm.   Pulmonary:      Effort: Pulmonary effort is normal.      Breath sounds: Normal breath sounds.   Abdominal:      Palpations: Abdomen is soft.      Tenderness: There is no abdominal tenderness. There is no guarding or rebound.   Musculoskeletal:         General: No swelling or tenderness. Normal range of motion.      Cervical back: Normal range of motion and neck supple. No rigidity or tenderness.   Skin:     Coloration: Skin is not jaundiced or pale.   Neurological:      Mental Status: He is alert and oriented to person, place, and time.         Assessment/Plan   Diagnoses and all orders for this visit:  Neck pain  - Reassurance  - Tylenol ibuprofen as needed  - Heating pad to neck if needed  - Follow-up if no improvement.    Paranoia  -Continue Risperdal as prescribed  - Follow-up with psychiatry as scheduled.

## 2025-05-26 ENCOUNTER — HOSPITAL ENCOUNTER (EMERGENCY)
Facility: HOSPITAL | Age: 20
Discharge: HOME | End: 2025-05-26
Payer: MEDICAID

## 2025-05-26 VITALS
OXYGEN SATURATION: 99 % | DIASTOLIC BLOOD PRESSURE: 91 MMHG | HEART RATE: 89 BPM | WEIGHT: 231.26 LBS | BODY MASS INDEX: 32.38 KG/M2 | TEMPERATURE: 97.6 F | RESPIRATION RATE: 18 BRPM | HEIGHT: 71 IN | SYSTOLIC BLOOD PRESSURE: 149 MMHG

## 2025-05-26 DIAGNOSIS — J02.9 SORE THROAT: Primary | ICD-10-CM

## 2025-05-26 DIAGNOSIS — R03.0 ELEVATED BP WITHOUT DIAGNOSIS OF HYPERTENSION: ICD-10-CM

## 2025-05-26 LAB — S PYO DNA THROAT QL NAA+PROBE: NOT DETECTED

## 2025-05-26 PROCEDURE — 99283 EMERGENCY DEPT VISIT LOW MDM: CPT

## 2025-05-26 PROCEDURE — 87651 STREP A DNA AMP PROBE: CPT | Performed by: PHYSICIAN ASSISTANT

## 2025-05-26 ASSESSMENT — PAIN SCALES - GENERAL: PAINLEVEL_OUTOF10: 2

## 2025-05-26 ASSESSMENT — PAIN - FUNCTIONAL ASSESSMENT: PAIN_FUNCTIONAL_ASSESSMENT: 0-10

## 2025-05-26 ASSESSMENT — COLUMBIA-SUICIDE SEVERITY RATING SCALE - C-SSRS
2. HAVE YOU ACTUALLY HAD ANY THOUGHTS OF KILLING YOURSELF?: NO
6. HAVE YOU EVER DONE ANYTHING, STARTED TO DO ANYTHING, OR PREPARED TO DO ANYTHING TO END YOUR LIFE?: NO
1. IN THE PAST MONTH, HAVE YOU WISHED YOU WERE DEAD OR WISHED YOU COULD GO TO SLEEP AND NOT WAKE UP?: NO

## 2025-05-26 ASSESSMENT — PAIN DESCRIPTION - LOCATION: LOCATION: THROAT

## 2025-05-26 ASSESSMENT — PAIN DESCRIPTION - PAIN TYPE: TYPE: ACUTE PAIN

## 2025-05-27 NOTE — DISCHARGE INSTRUCTIONS
Continue to monitor symptoms.  May drink hot tea, gargle with salt water, gargle with cayenne pepper water.  May use over-the-counter medications such as HurriCaine spray or cough drops to soothe the throat.  Recommend following up with your primary care provider and if symptoms persist ENT.    Blood pressure found to be elevated in the ER today. When you see your primary care doctor please discuss your blood pressure.  If it remains elevated they may want to talk to about starting medication to reduce your risk of death, heart attack, stroke, blindness, kidney failure and erectile dysfunction  Develop any new or worsening symptoms please return to ER

## 2025-05-27 NOTE — ED TRIAGE NOTES
"PT C/O \"WEIRD FEELING WHEN SWALLOWING\" FOR 1.5 WEEKS. PT DENIES FEVER, CHILLS, COUGH, SOA, CONGESTION.   "

## 2025-05-27 NOTE — ED PROVIDER NOTES
"Chief Complaint   Patient presents with    Sore Throat     HPI:   Maria E Castro is an 19 y.o. male with history of ADHD and autism spectrum disorder who presents to the ED for evaluation of \"abnormal feeling\" in his throat.  It began about a week and a half ago but became worse over the past hour.  Denies any dysphagia but states that he can \"feel myself swallowing more than normal\".  Endorses associated runny nose.  Denies any unintentional weight loss, chest pain, neck pain or stiffness, headaches, fever, chills, shortness of breath, drooling, voice changes.    Medications: Gabapentin and risperidone although he admits to noncompliance  Soc HX:  RX Allergies[1]: NKDA    Physical Exam  Vitals and nursing note reviewed.   Constitutional:       General: He is not in acute distress.     Appearance: Normal appearance. He is not ill-appearing or toxic-appearing.      Comments: Elevated BMI   HENT:      Right Ear: Tympanic membrane, ear canal and external ear normal. No middle ear effusion. Tympanic membrane is not erythematous.      Left Ear: Tympanic membrane, ear canal and external ear normal.  No middle ear effusion. Tympanic membrane is not erythematous.      Nose: No congestion or rhinorrhea.      Mouth/Throat:      Mouth: Mucous membranes are moist.      Pharynx: Uvula midline. Posterior oropharyngeal erythema present. No pharyngeal swelling or uvula swelling.      Tonsils: No tonsillar exudate or tonsillar abscesses. 1+ on the right. 0 on the left.   Eyes:      Pupils: Pupils are equal, round, and reactive to light.   Neck:      Thyroid: No thyromegaly.   Cardiovascular:      Rate and Rhythm: Normal rate and regular rhythm.      Pulses: Normal pulses.      Heart sounds: Normal heart sounds.   Pulmonary:      Effort: Pulmonary effort is normal. No respiratory distress.      Breath sounds: Normal breath sounds.   Abdominal:      General: Bowel sounds are normal.      Palpations: Abdomen is soft.      " Follow up closely with your doctor in 3-5 days.    Take probiotics (lactobacillus, florajen, etc)  while on antibiotics to help replenish your normal ester. Probiotic should be taken daily, alf between doses of antibiotic. Kefir yogurt is also a good source of probiotics.    Go to the Emergency Room if worse.    Patient Education     Patient Education     Sinusitis (Antibiotic Treatment)    The sinuses are air-filled spaces within the bones of the face. They connect to the inside of the nose. Sinusitis is an inflammation of the tissue lining the sinus cavity. Sinus inflammation can occur during a cold. It can also be due to allergies to pollens and other particles in the air. Sinusitis can cause symptoms of sinus congestion and fullness. A sinus infection causes fever, headache and facial pain. There is often green or yellow drainage from the nose or into the back of the throat (post-nasal drip). You have been given antibiotics to treat this condition.  Home care:  · Take the full course of antibiotics as instructed. Do not stop taking them, even if you feel better.  · Drink plenty of water, hot tea, and other liquids. This may help thin mucus. It also may promote sinus drainage.  · Heat may help soothe painful areas of the face. Use a towel soaked in hot water. Or,  the shower and direct the hot spray onto your face. Using a vaporizer along with a menthol rub at night may also help.   · An expectorant containing guaifenesin may help thin the mucus and promote drainage from the sinuses.  · Over-the-counter decongestants may be used unless a similar medicine was prescribed. Nasal sprays work the fastest. Use one that contains phenylephrine or oxymetazoline. First blow the nose gently. Then use the spray. Do not use these medicines more often than directed on the label or symptoms may get worse. You may also use tablets containing pseudoephedrine. Avoid products that combine ingredients, because side  Tenderness: There is no abdominal tenderness. There is no guarding or rebound.   Musculoskeletal:         General: No deformity or signs of injury. Normal range of motion.      Cervical back: Normal range of motion.   Lymphadenopathy:      Cervical: Cervical adenopathy present.   Skin:     General: Skin is warm and dry.      Capillary Refill: Capillary refill takes less than 2 seconds.      Findings: No bruising.   Neurological:      Mental Status: He is alert.      Cranial Nerves: No cranial nerve deficit.      Comments: CN II through XII grossly intact     VS: As documented in the triage note and EMR flowsheet from this visit were reviewed.      Medical Decision Making:   ED Course as of 05/27/25 0526   Mon May 26, 2025   2105 Vitals Reviewed: Afebrile. Hyperotensive. Not tachycardic nor tachypneic. No hypoxia.   [KA]   3919 Patient is 19-year-old male who presents to the ED for evaluation of abnormal feeling in the right side of his throat.  On exam oropharynx is patent.  He has subtle tonsillar edema on the right side but uvula is midline.  There is no exudate.  No peritonsillar abscess.  No Phi's angina.  Normal range of motion of the neck without stiffness or pain.  Due to body habitus it is difficult to palpate any significant abnormalities of the neck however he does feel like he has some subtle lymphadenopathy on the right side greater than left.  Thyroid appears normal.  Will obtain strep swab. Considered EOE however patient is unsure of any recent weight loss and is not complaining of any dysphagia so I feel this may be less likely.  Strep swab was obtained and I personally viewed as negative.  I recommended patient follow-up with his primary care provider for further evaluation.  Advised that if does not improve he could ask for referral to ENT and/or GI as well as undergo thyroid testing.  Recommended he talk to his PCP about his elevated blood pressure and if remains elevated consider medication.   effects may be increased. Read labels. You can also ask the pharmacist for help. (NOTE: Persons with high blood pressure should not use decongestants. They can raise blood pressure.)  · Over-the-counter antihistamines may help if allergies contributed to your sinusitis.    · Do not use nasal rinses or irrigation during an acute sinus infection, unless told to by your health care provider. Rinsing may spread the infection to other sinuses.  · Use acetaminophen or ibuprofen to control pain, unless another pain medicine was prescribed. (If you have chronic liver or kidney disease or ever had a stomach ulcer, talk with your doctor before using these medicines. Aspirin should never be used in anyone under 18 years of age who is ill with a fever. It may cause severe liver damage.)  · Don't smoke. This can worsen symptoms.  Follow-up care  Follow up with your healthcare provider or our staff if you are not improving within the next week.  When to seek medical advice  Call your healthcare provider if any of these occur:  · Facial pain or headache becoming more severe  · Stiff neck  · Unusual drowsiness or confusion  · Swelling of the forehead or eyelids  · Vision problems, including blurred or double vision  · Fever of 100.4ºF (38ºC) or higher, or as directed by your healthcare provider  · Seizure  · Breathing problems  · Symptoms not resolving within 10 days  © 4437-1086 [employer]. [address] [place] [address], [place] [address]. All rights reserved. This information is not intended as a substitute for professional medical care. Always follow your healthcare professional's instructions.               Bronchitis with Wheezing (Viral or Bacterial: Adult)    Bronchitis is an infection of the air passages. It often occurs during the common cold and is usually caused by a virus. Symptoms include cough with mucus (phlegm) and low-grade fever. This illness is contagious during the first few days and is spread through  Encouraged continued supportive care with over-the-counter pain medications as needed.  He is agreeable with plan. [KA]      ED Course User Index  [KA] Steph Romero PA-C         Diagnoses as of 05/27/25 0526   Sore throat   Elevated BP without diagnosis of hypertension      Escalation of Care: Appropriate for outpatient management   Counseling: Spoke with the patient and discussed today´s findings, in addition to providing specific details for the plan of care and expected course.  Patient was given the opportunity to ask questions.    Discussed return precautions and importance of follow-up.  Advised to follow-up with PCP.  Advised to return to the ED for changing or worsening symptoms, new symptoms, complaint specific precautions, and precautions listed on the discharge paperwork.  Educated on the common potential side effects of medications prescribed.    I advised the patient that the emergency evaluation and treatment provided today doesn't end their need for medical care. It is very important that they follow-up with their primary care provider or other specialist as instructed.    The plan of care was mutually agreed upon with the patient. The patient and/or family were given the opportunity to ask questions. All questions asked today in the ED were answered to the best of my ability with today's information.    I specifically advised the patient to return to the ED for changing or worsening symptoms, worrisome new symptoms, or for any complaint specific precautions listed on the discharge paperwork.    This patient was cared for in the setting of nationwide stress on resources and staffing.    This report was transcribed using voice recognition software.  Every effort was made to ensure accuracy, however, inadvertently computerized transcription errors may be present.         [1] No Known Allergies       Steph Romero PA-C  05/27/25 0526     the air by coughing and sneezing, or by direct contact (touching the sick person and then touching your own eyes, nose, or mouth).  If there is a lot of inflammation, air flow is restricted. The air passages may also go into spasm, especially if you have asthma. This causes wheezing and difficulty breathing even in people who do not have asthma.  Bronchitis usually lasts 7 to 14 days. The wheezing should improve with treatment during the first week. An inhaler is often prescribed to relax the air passages and stop wheezing. Antibiotics will be prescribed if your doctor thinks there is also a secondary bacterial infection.  Home care  · If symptoms are severe, rest at home for the first 2 to 3 days. When you go back to your usual activities, don't let yourself get too tired.  · Do not smoke. Also avoid being exposed to secondhand smoke.  · You may use over-the-counter medicine to control fever or pain, unless another medicine was prescribed. Note: If you have chronic liver or kidney disease or have ever had a stomach ulcer or gastrointestinal bleeding, talk with your healthcare provider before using these medicines. Also talk to your provider if you are taking medicine to prevent blood clots.) Aspirin should never be given to anyone younger than 18 years of age who is ill with a viral infection or fever. It may cause severe liver or brain damage.  · Your appetite may be poor, so a light diet is fine. Avoid dehydration by drinking 6 to 8 glasses of fluids per day (such as water, soft drinks, sports drinks, juices, tea, or soup). Extra fluids will help loosen secretions in the nose and lungs.  · Over-the-counter cough, cold, and sore-throat medicines will not shorten the length of the illness, but they may be helpful to reduce symptoms. (Note: Do not use decongestants if you have high blood pressure.)  · If you were given an inhaler, use it exactly as directed. If you need to use it more often than prescribed, your  condition may be worsening. If this happens, contact your healthcare provider.  · If prescribed, finish all antibiotic medicine, even if you are feeling better after only a few days.  Follow-up care  Follow up with your healthcare provider, or as advised. If you had an X-ray or ECG (electrocardiogram), a specialist will review it. You will be notified of any new findings that may affect your care.  Note: If you are age 65 or older, or if you have a chronic lung disease or condition that affects your immune system, or you smoke, talk to your healthcare provider about having a pneumococcal vaccinations and a yearly influenza vaccination (flu shot).  When to seek medical advice   Call your healthcare provider right away if any of these occur:  · Fever of 100.4°F (38°C) or higher  · Coughing up increasing amounts of colored sputum  · Weakness, drowsiness, headache, facial pain, ear pain, or a stiff neck  Call 911, or get immediate medical care  Contact emergency services right away if any of these occur.  · Coughing up blood  · Worsening weakness, drowsiness, headache, or stiff neck  · Increased wheezing not helped with medication, shortness of breath, or pain with breathing  © 0406-2291 [employer]. [address] [place] [address]. All rights reserved. This information is not intended as a substitute for professional medical care. Always follow your healthcare professional's instructions.                 In the next few weeks you may receive a [employer] survey regarding your most recent clinic visit with us.  Please take a few moments to accurately evaluate your visit. We strive to provide you with the best medical care. Your feedback will assist us in achieving this. Again, thank you for your time and we look forward to your next visit.         If we need to contact you regarding any test results, we will make 2 attempts to reach you at the number you have listed during your office visit  today. If we are unable to reach you, a letter with your results and any further instructions will be mailed to you home.

## 2025-05-31 ENCOUNTER — HOSPITAL ENCOUNTER (EMERGENCY)
Facility: HOSPITAL | Age: 20
Discharge: HOME | End: 2025-06-01
Payer: MEDICAID

## 2025-05-31 ENCOUNTER — APPOINTMENT (OUTPATIENT)
Dept: RADIOLOGY | Facility: HOSPITAL | Age: 20
End: 2025-05-31
Payer: MEDICAID

## 2025-05-31 DIAGNOSIS — J02.9 VIRAL PHARYNGITIS: Primary | ICD-10-CM

## 2025-05-31 PROCEDURE — 2500000001 HC RX 250 WO HCPCS SELF ADMINISTERED DRUGS (ALT 637 FOR MEDICARE OP): Performed by: SURGERY

## 2025-05-31 PROCEDURE — 70360 X-RAY EXAM OF NECK: CPT

## 2025-05-31 PROCEDURE — 87637 SARSCOV2&INF A&B&RSV AMP PRB: CPT | Performed by: SURGERY

## 2025-05-31 PROCEDURE — 70360 X-RAY EXAM OF NECK: CPT | Performed by: RADIOLOGY

## 2025-05-31 PROCEDURE — 99284 EMERGENCY DEPT VISIT MOD MDM: CPT

## 2025-05-31 PROCEDURE — 87651 STREP A DNA AMP PROBE: CPT | Performed by: SURGERY

## 2025-05-31 RX ORDER — IBUPROFEN 600 MG/1
600 TABLET, FILM COATED ORAL ONCE
Status: COMPLETED | OUTPATIENT
Start: 2025-05-31 | End: 2025-05-31

## 2025-05-31 RX ORDER — LIDOCAINE HYDROCHLORIDE 20 MG/ML
15 SOLUTION OROPHARYNGEAL ONCE
Status: DISCONTINUED | OUTPATIENT
Start: 2025-05-31 | End: 2025-06-01 | Stop reason: HOSPADM

## 2025-05-31 RX ORDER — ACETAMINOPHEN 325 MG/1
650 TABLET ORAL ONCE
Status: COMPLETED | OUTPATIENT
Start: 2025-05-31 | End: 2025-05-31

## 2025-05-31 RX ADMIN — IBUPROFEN 600 MG: 600 TABLET ORAL at 23:27

## 2025-05-31 RX ADMIN — ACETAMINOPHEN 650 MG: 325 TABLET, FILM COATED ORAL at 23:27

## 2025-05-31 ASSESSMENT — PAIN - FUNCTIONAL ASSESSMENT: PAIN_FUNCTIONAL_ASSESSMENT: 0-10

## 2025-05-31 ASSESSMENT — PAIN SCALES - GENERAL: PAINLEVEL_OUTOF10: 0 - NO PAIN

## 2025-05-31 NOTE — Clinical Note
Clementine Castro was seen and treated in our emergency department on 5/31/2025.  He may return to work on 06/03/2025.       If you have any questions or concerns, please don't hesitate to call.      Silvio Cleveland PA-C

## 2025-06-01 VITALS
OXYGEN SATURATION: 96 % | SYSTOLIC BLOOD PRESSURE: 140 MMHG | HEART RATE: 97 BPM | DIASTOLIC BLOOD PRESSURE: 80 MMHG | WEIGHT: 220 LBS | TEMPERATURE: 97 F | RESPIRATION RATE: 18 BRPM | HEIGHT: 71 IN | BODY MASS INDEX: 30.8 KG/M2

## 2025-06-01 LAB
FLUAV RNA RESP QL NAA+PROBE: NOT DETECTED
FLUBV RNA RESP QL NAA+PROBE: NOT DETECTED
RSV RNA RESP QL NAA+PROBE: NOT DETECTED
S PYO DNA THROAT QL NAA+PROBE: NOT DETECTED
SARS-COV-2 RNA RESP QL NAA+PROBE: NOT DETECTED

## 2025-06-01 ASSESSMENT — PAIN DESCRIPTION - PAIN TYPE: TYPE: ACUTE PAIN

## 2025-06-01 ASSESSMENT — PAIN DESCRIPTION - LOCATION: LOCATION: THROAT

## 2025-06-01 ASSESSMENT — PAIN - FUNCTIONAL ASSESSMENT: PAIN_FUNCTIONAL_ASSESSMENT: 0-10

## 2025-06-01 ASSESSMENT — PAIN SCALES - GENERAL: PAINLEVEL_OUTOF10: 3

## 2025-06-01 NOTE — DISCHARGE INSTRUCTIONS
You have been seen at a [facility].  Please follow-up with your primary care provider in the next 1 to 2 days for further evaluation and routine follow-up.  Please return to the emergency room if having any worsening symptoms.  Please follow-up with any specialists if discussed during your emergency room stay.

## 2025-06-01 NOTE — ED TRIAGE NOTES
"Pt from private vehicle c/c of throat problems x 4days. Pt states \"it feels like my rivera apple in my throat is moving\" denies pain. Pt states it can be difficult to swallow but able to eat and drink okay and swallow secretions. Was here on the 26 for same issue.    Pt A&Ox3, pt alert calm cooperative with care. Pt resp even and unlabored.     PMH: n/a    "

## 2025-06-01 NOTE — ED PROVIDER NOTES
"Chief Complaint   Patient presents with    Sore Throat     HPI:   Clementine Castro is an 19 y.o. male presenting to the ED for chief complaint of a sore throat.  He explains that his symptoms started a few days ago and he feels that his \"Benjamin's apple is swollen\".  States that he has increased pain with swallowing however he still able to tolerate p.o.  Denies any congestion symptoms.  He reports some mild dysphonia.  He denies any fever chills vomiting abdominal pain nausea chest pain or shortness of breath.  No other areas of pain.  No other complaints.      RX Allergies[1]:  Medical History[2]  Surgical History[3]  Family History[4]     Physical Exam  Vitals and nursing note reviewed.   Constitutional:       General: He is not in acute distress.     Appearance: He is well-developed.   HENT:      Head: Normocephalic and atraumatic.      Right Ear: Tympanic membrane normal.      Left Ear: Tympanic membrane normal.      Mouth/Throat:      Mouth: Mucous membranes are moist.      Pharynx: Uvula midline. No pharyngeal swelling or posterior oropharyngeal erythema.   Eyes:      Conjunctiva/sclera: Conjunctivae normal.   Cardiovascular:      Rate and Rhythm: Normal rate and regular rhythm.      Heart sounds: No murmur heard.  Pulmonary:      Effort: Pulmonary effort is normal. No respiratory distress.      Breath sounds: Normal breath sounds.   Abdominal:      Palpations: Abdomen is soft.      Tenderness: There is no abdominal tenderness.   Musculoskeletal:         General: No swelling.      Cervical back: Neck supple.   Skin:     General: Skin is warm and dry.      Capillary Refill: Capillary refill takes less than 2 seconds.   Neurological:      Mental Status: He is alert.   Psychiatric:         Mood and Affect: Mood normal.        VS: As documented in the triage note and EMR flowsheet from this visit were reviewed.     Medical Decision Making: This is a 19-year-old male presenting to the ED for chief complaint of a sore " throat.  An isolated complaint of a sore throat for the last 4 days.  Patient is concerned that his Benjamin's apple is swollen he has increased pain with swallowing but denies any other complaints.  On my initial examination his vitals were stable.  He did have some mild dysphonia no trismus and he was tolerating his secretions.  His posterior pharynx was clear he had no swelling no erythema or exudate no lymphadenopathy no meningismus no fever and his heart and lungs were clear.  X-ray soft tissue lateral showed no evidence of swelling or epiglottic thickening.  Patient pain was medicated with Motrin and Tylenol and he did feel relief of his symptoms.  He did decline the lidocaine solution.  He was negative for COVID flu RSV and strep.  He was discharged in stable condition and a work note was provided.  He understands return precautions.    Diagnoses as of 06/01/25 0029   Viral pharyngitis     Counseling: Spoke with the patient and discussed today´s findings, in addition to providing specific details for the plan of care and expected course.  Patient was given the opportunity to ask questions.    Discussed return precautions and importance of follow-up.  Advised to follow-up with PCP.  I specifically advised to return to the ED for changing or worsening symptoms, new symptoms, complaint specific precautions, and precautions listed on the discharge paperwork.  Educated on the common potential side effects of medications prescribed.    I advised the patient that the emergency evaluation and treatment provided today doesn't end their need for medical care. It is very important that they follow-up with their primary care provider or other specialist as instructed.    The plan of care was mutually agreed upon with the patient. The patient and/or family were given the opportunity to ask questions. All questions asked today in the ED were answered to the best of my ability with today's information.    This report was  transcribed using voice recognition software.  Every effort was made to ensure accuracy, however, inadvertently computerized transcription errors may be present.         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    Otitis media, unspecified, unspecified ear     Chronic otitis media    Personal history of other mental and behavioral disorders 11/14/2017    History of attention deficit hyperactivity disorder (ADHD)    Snoring     Snoring    Unspecified lack of expected normal physiological development in childhood     Developmental delay    Unspecified mood (affective) disorder 01/17/2019    Mood disorder   [3]   Past Surgical History:  Procedure Laterality Date    ADENOIDECTOMY  09/17/2017    Adenoidectomy    CIRCUMCISION, PRIMARY  02/16/2016    Elective Circumcision    OTHER SURGICAL HISTORY  12/10/2013    Ear Pressure Equalization Tube, Insertion    OTHER SURGICAL HISTORY  05/24/2020    Tonsillectomy with adenoidectomy    OTHER SURGICAL HISTORY  09/17/2017    Treatment Of The Left Foot    OTHER SURGICAL HISTORY  09/17/2017    Treatment Of The Right Foot   [4] No family history on file.       Silvio Cleveland PA-C  06/01/25 0127

## 2025-06-05 ENCOUNTER — APPOINTMENT (OUTPATIENT)
Dept: PRIMARY CARE | Facility: CLINIC | Age: 20
End: 2025-06-05
Payer: MEDICAID

## 2025-06-05 VITALS
HEART RATE: 90 BPM | WEIGHT: 236 LBS | BODY MASS INDEX: 33.04 KG/M2 | TEMPERATURE: 97.8 F | HEIGHT: 71 IN | OXYGEN SATURATION: 98 % | DIASTOLIC BLOOD PRESSURE: 92 MMHG | SYSTOLIC BLOOD PRESSURE: 130 MMHG

## 2025-06-05 DIAGNOSIS — F20.9 SCHIZOPHRENIA, UNSPECIFIED TYPE: ICD-10-CM

## 2025-06-05 DIAGNOSIS — Z13.29 SCREENING FOR THYROID DISORDER: ICD-10-CM

## 2025-06-05 DIAGNOSIS — J02.9 VIRAL PHARYNGITIS: Primary | ICD-10-CM

## 2025-06-05 DIAGNOSIS — J30.2 SEASONAL ALLERGIES: ICD-10-CM

## 2025-06-05 PROCEDURE — 99214 OFFICE O/P EST MOD 30 MIN: CPT | Performed by: NURSE PRACTITIONER

## 2025-06-05 PROCEDURE — 3008F BODY MASS INDEX DOCD: CPT | Performed by: NURSE PRACTITIONER

## 2025-06-05 PROCEDURE — 1036F TOBACCO NON-USER: CPT | Performed by: NURSE PRACTITIONER

## 2025-06-05 RX ORDER — CETIRIZINE HYDROCHLORIDE 10 MG/1
10 TABLET ORAL DAILY PRN
Qty: 30 TABLET | Refills: 2 | Status: SHIPPED | OUTPATIENT
Start: 2025-06-05

## 2025-06-05 ASSESSMENT — ENCOUNTER SYMPTOMS
COUGH: 0
FEVER: 0
GASTROINTESTINAL NEGATIVE: 1
SORE THROAT: 1
CHILLS: 0

## 2025-06-05 NOTE — PROGRESS NOTES
"Subjective   Patient ID: Clementine Castor is a 19 y.o. male who presents for Sore Throat (Complains of sore throat x 2 weeks. ).    Sore Throat   Pertinent negatives include no coughing or ear pain.      Patient presents to clinic accompanied by his mother for evaluation of irritated throat x 2 weeks.  Patient was recently seen in the ER for similar symptoms 5/26/2025 and again 5/31/2025.  Patient had negative strep negative flu both days discharged to home with viral pharyngitis.    Today patient tells me he feels like he swallowed his Lamas apple and he only has pain when he swallows.  Patient also tells me he feels like he has a hole in his neck and he has a burning sensation in his chest.  X 2 days.  She denies fevers cough feeling short of breath.      Mother states she has been having problems with her thyroid and she believes that because he has been in her talk about her thyroid he has been complaining about problems in his throat area.     Mother states she has been trying to get him to relax and take deep breaths.  Mother states he has an upcoming appointment with mental health at [facility] in a few days.      Review of Systems   Constitutional:  Negative for chills and fever.   HENT:  Positive for sore throat. Negative for ear pain.         See HPI   Respiratory:  Negative for cough.    Gastrointestinal: Negative.    Skin: Negative.        Objective   BP (!) 130/92 (BP Location: Left arm, Patient Position: Sitting, BP Cuff Size: Adult)   Pulse 90   Temp 36.6 °C (97.8 °F) (Temporal)   Ht 1.803 m (5' 11\")   Wt 107 kg (236 lb)   SpO2 98%   BMI 32.92 kg/m²     Physical Exam  Vitals reviewed.   Constitutional:       General: He is not in acute distress.     Appearance: Normal appearance. He is not ill-appearing.   HENT:      Mouth/Throat:      Mouth: Mucous membranes are moist.      Tongue: No lesions.      Pharynx: Oropharynx is clear. No pharyngeal swelling, oropharyngeal exudate, posterior " oropharyngeal erythema or uvula swelling.      Tonsils: No tonsillar exudate or tonsillar abscesses.   Neck:      Thyroid: No thyroid mass, thyromegaly or thyroid tenderness.   Cardiovascular:      Rate and Rhythm: Normal rate and regular rhythm.   Pulmonary:      Effort: Pulmonary effort is normal.      Breath sounds: Normal breath sounds.   Abdominal:      Palpations: Abdomen is soft.      Tenderness: There is no abdominal tenderness. There is no guarding or rebound.   Skin:     General: Skin is warm and dry.   Neurological:      Mental Status: He is alert and oriented to person, place, and time.         Assessment/Plan   Diagnoses and all orders for this visit:  Viral pharyngitis  - Exam normal  -Advised supportive treatment warm salt water gargles, Tylenol or ibuprofen if needed  -ENT referral if needed.  Screening for thyroid disorder  -     Tsh With Reflex To Free T4 If Abnormal  Seasonal allergies  -     cetirizine (ZyrTEC) 10 mg tablet; Take 1 tablet (10 mg) by mouth once daily as needed for allergies.

## 2025-06-05 NOTE — PATIENT INSTRUCTIONS
Warm salt water gargles may be helpful for the sore throat pain.  Tylenol and Profen as needed.  Increase fluids.  Labs drawn in clinic today to check thyroid levels.  Will refer to ENT if symptoms persist.  Follow-up in our clinic if no improvement.

## 2025-06-06 LAB — TSH SERPL-ACNC: 3.01 MIU/L (ref 0.4–4.5)

## 2025-06-09 ENCOUNTER — HOSPITAL ENCOUNTER (EMERGENCY)
Facility: HOSPITAL | Age: 20
Discharge: HOME | End: 2025-06-09
Payer: MEDICAID

## 2025-06-09 ENCOUNTER — APPOINTMENT (OUTPATIENT)
Dept: RADIOLOGY | Facility: HOSPITAL | Age: 20
End: 2025-06-09
Payer: MEDICAID

## 2025-06-09 VITALS
DIASTOLIC BLOOD PRESSURE: 76 MMHG | WEIGHT: 222 LBS | BODY MASS INDEX: 31.08 KG/M2 | OXYGEN SATURATION: 98 % | RESPIRATION RATE: 18 BRPM | HEIGHT: 71 IN | HEART RATE: 100 BPM | SYSTOLIC BLOOD PRESSURE: 98 MMHG | TEMPERATURE: 97.2 F

## 2025-06-09 DIAGNOSIS — R13.10 DYSPHAGIA, UNSPECIFIED TYPE: Primary | ICD-10-CM

## 2025-06-09 PROBLEM — J35.3 ENLARGED TONSILS AND ADENOIDS: Status: ACTIVE | Noted: 2025-06-09

## 2025-06-09 PROBLEM — M21.41 ACQUIRED BILATERAL FLAT FEET: Status: ACTIVE | Noted: 2025-06-09

## 2025-06-09 PROBLEM — G47.33 OBSTRUCTIVE SLEEP APNEA: Status: ACTIVE | Noted: 2025-06-09

## 2025-06-09 PROBLEM — E55.9 VITAMIN D DEFICIENCY: Status: ACTIVE | Noted: 2025-06-09

## 2025-06-09 PROBLEM — E16.1 HYPERINSULINEMIA: Status: ACTIVE | Noted: 2025-06-09

## 2025-06-09 PROBLEM — F12.90 MARIJUANA USE: Status: ACTIVE | Noted: 2021-11-02

## 2025-06-09 PROBLEM — R46.89 BEHAVIOR PROBLEM: Status: ACTIVE | Noted: 2025-06-09

## 2025-06-09 PROBLEM — L30.9 ECZEMA: Status: ACTIVE | Noted: 2025-06-09

## 2025-06-09 PROBLEM — F90.2 ATTENTION DEFICIT HYPERACTIVITY DISORDER (ADHD), COMBINED TYPE: Status: ACTIVE | Noted: 2022-02-08

## 2025-06-09 PROBLEM — M20.11 ACQUIRED HALLUX VALGUS OF RIGHT FOOT: Status: ACTIVE | Noted: 2025-06-09

## 2025-06-09 PROBLEM — T78.3XXA ANGIOEDEMA: Status: ACTIVE | Noted: 2025-06-09

## 2025-06-09 PROBLEM — M20.12 ACQUIRED HALLUX VALGUS, LEFT: Status: ACTIVE | Noted: 2025-06-09

## 2025-06-09 PROBLEM — F22 DELUSIONS (MULTI): Status: ACTIVE | Noted: 2025-06-09

## 2025-06-09 PROBLEM — E66.9 OBESITY: Status: ACTIVE | Noted: 2025-06-09

## 2025-06-09 PROBLEM — G47.30 SLEEP DISORDER BREATHING: Status: ACTIVE | Noted: 2025-06-09

## 2025-06-09 PROBLEM — G31.84 MILD COGNITIVE IMPAIRMENT: Status: ACTIVE | Noted: 2025-06-09

## 2025-06-09 PROBLEM — F91.3 OPPOSITIONAL DEFIANT DISORDER: Status: ACTIVE | Noted: 2021-11-02

## 2025-06-09 PROBLEM — D64.9 ANEMIA: Status: ACTIVE | Noted: 2025-06-09

## 2025-06-09 PROBLEM — F63.81 INTERMITTENT EXPLOSIVE DISORDER: Status: ACTIVE | Noted: 2021-11-02

## 2025-06-09 PROBLEM — H91.93 DECREASED HEARING OF BOTH EARS: Status: ACTIVE | Noted: 2025-06-09

## 2025-06-09 PROBLEM — H52.13 MYOPIA, BILATERAL: Status: ACTIVE | Noted: 2025-06-09

## 2025-06-09 PROBLEM — R45.1 AGITATION: Status: ACTIVE | Noted: 2025-06-09

## 2025-06-09 PROBLEM — L83 ACANTHOSIS NIGRICANS: Status: ACTIVE | Noted: 2025-06-09

## 2025-06-09 PROBLEM — R46.89 AGGRESSIVE BEHAVIOR: Status: ACTIVE | Noted: 2025-06-09

## 2025-06-09 PROBLEM — H52.203 ASTIGMATISM OF BOTH EYES: Status: ACTIVE | Noted: 2018-11-21

## 2025-06-09 PROBLEM — R62.50 DEVELOPMENTAL DELAY: Status: ACTIVE | Noted: 2025-06-09

## 2025-06-09 PROBLEM — F81.9 LEARNING DIFFICULTY: Status: ACTIVE | Noted: 2025-06-09

## 2025-06-09 PROBLEM — M21.42 ACQUIRED BILATERAL FLAT FEET: Status: ACTIVE | Noted: 2025-06-09

## 2025-06-09 PROBLEM — R03.0 ELEVATED BLOOD PRESSURE READING WITHOUT DIAGNOSIS OF HYPERTENSION: Status: ACTIVE | Noted: 2025-06-09

## 2025-06-09 PROBLEM — R73.09 ELEVATED HEMOGLOBIN A1C: Status: ACTIVE | Noted: 2025-06-09

## 2025-06-09 LAB
ALBUMIN SERPL BCP-MCNC: 4.3 G/DL (ref 3.4–5)
ALP SERPL-CCNC: 50 U/L (ref 33–120)
ALT SERPL W P-5'-P-CCNC: 58 U/L (ref 10–52)
ANION GAP SERPL CALC-SCNC: 17 MMOL/L (ref 10–20)
AST SERPL W P-5'-P-CCNC: 30 U/L (ref 9–39)
BASOPHILS # BLD AUTO: 0.05 X10*3/UL (ref 0–0.1)
BASOPHILS NFR BLD AUTO: 0.5 %
BILIRUB SERPL-MCNC: 0.3 MG/DL (ref 0–1.2)
BUN SERPL-MCNC: 9 MG/DL (ref 6–23)
CALCIUM SERPL-MCNC: 9.4 MG/DL (ref 8.6–10.3)
CHLORIDE SERPL-SCNC: 108 MMOL/L (ref 98–107)
CO2 SERPL-SCNC: 18 MMOL/L (ref 21–32)
CREAT SERPL-MCNC: 0.84 MG/DL (ref 0.5–1.3)
EGFRCR SERPLBLD CKD-EPI 2021: >90 ML/MIN/1.73M*2
EOSINOPHIL # BLD AUTO: 0.46 X10*3/UL (ref 0–0.7)
EOSINOPHIL NFR BLD AUTO: 5 %
ERYTHROCYTE [DISTWIDTH] IN BLOOD BY AUTOMATED COUNT: 12.5 % (ref 11.5–14.5)
GLUCOSE SERPL-MCNC: 92 MG/DL (ref 74–99)
HCT VFR BLD AUTO: 46.5 % (ref 41–52)
HGB BLD-MCNC: 15.2 G/DL (ref 13.5–17.5)
IMM GRANULOCYTES # BLD AUTO: 0.05 X10*3/UL (ref 0–0.7)
IMM GRANULOCYTES NFR BLD AUTO: 0.5 % (ref 0–0.9)
LYMPHOCYTES # BLD AUTO: 3.38 X10*3/UL (ref 1.2–4.8)
LYMPHOCYTES NFR BLD AUTO: 36.7 %
MCH RBC QN AUTO: 27.2 PG (ref 26–34)
MCHC RBC AUTO-ENTMCNC: 32.7 G/DL (ref 32–36)
MCV RBC AUTO: 83 FL (ref 80–100)
MONOCYTES # BLD AUTO: 0.58 X10*3/UL (ref 0.1–1)
MONOCYTES NFR BLD AUTO: 6.3 %
NEUTROPHILS # BLD AUTO: 4.69 X10*3/UL (ref 1.2–7.7)
NEUTROPHILS NFR BLD AUTO: 51 %
NRBC BLD-RTO: 0 /100 WBCS (ref 0–0)
PLATELET # BLD AUTO: 338 X10*3/UL (ref 150–450)
POTASSIUM SERPL-SCNC: 4.5 MMOL/L (ref 3.5–5.3)
PROT SERPL-MCNC: 7.4 G/DL (ref 6.4–8.2)
RBC # BLD AUTO: 5.59 X10*6/UL (ref 4.5–5.9)
SODIUM SERPL-SCNC: 138 MMOL/L (ref 136–145)
WBC # BLD AUTO: 9.2 X10*3/UL (ref 4.4–11.3)

## 2025-06-09 PROCEDURE — 2550000001 HC RX 255 CONTRASTS: Performed by: PHYSICIAN ASSISTANT

## 2025-06-09 PROCEDURE — 36415 COLL VENOUS BLD VENIPUNCTURE: CPT | Performed by: PHYSICIAN ASSISTANT

## 2025-06-09 PROCEDURE — 85025 COMPLETE CBC W/AUTO DIFF WBC: CPT | Performed by: PHYSICIAN ASSISTANT

## 2025-06-09 PROCEDURE — 70491 CT SOFT TISSUE NECK W/DYE: CPT | Performed by: STUDENT IN AN ORGANIZED HEALTH CARE EDUCATION/TRAINING PROGRAM

## 2025-06-09 PROCEDURE — 84075 ASSAY ALKALINE PHOSPHATASE: CPT | Performed by: PHYSICIAN ASSISTANT

## 2025-06-09 PROCEDURE — 99285 EMERGENCY DEPT VISIT HI MDM: CPT | Mod: 25

## 2025-06-09 PROCEDURE — 70491 CT SOFT TISSUE NECK W/DYE: CPT

## 2025-06-09 RX ADMIN — IOHEXOL 75 ML: 350 INJECTION, SOLUTION INTRAVENOUS at 22:31

## 2025-06-09 ASSESSMENT — COLUMBIA-SUICIDE SEVERITY RATING SCALE - C-SSRS
6. HAVE YOU EVER DONE ANYTHING, STARTED TO DO ANYTHING, OR PREPARED TO DO ANYTHING TO END YOUR LIFE?: NO
1. IN THE PAST MONTH, HAVE YOU WISHED YOU WERE DEAD OR WISHED YOU COULD GO TO SLEEP AND NOT WAKE UP?: NO
2. HAVE YOU ACTUALLY HAD ANY THOUGHTS OF KILLING YOURSELF?: NO

## 2025-06-10 NOTE — ED PROVIDER NOTES
"Chief Complaint   Patient presents with    Sore Throat     HPI:   Clementine Castro is an 19 y.o. m with history ADHD, developmental delay, LATRICE who presents to the ED for evaluation of esophageal foreign body sensation.  Patient states that for the past 3 weeks he has felt like there was something stuck in his throat.  He said it is not painful but every time he tries to swallow it feels like food is getting stuck.  Even liquids are bothering him.  He says his voice sounds hoarse and congested.  He has been seen for this twice already and had negative strep test, negative COVID test.  He has been using over-the-counter nasal spray and it does not seem to be helping.  He has tried Flonase.  He denies drooling, odynophagia.  He denies any fevers, neck pain or stiffness.  He is unsure if he has lost any weight.  He has not identified any foods that seem to trigger this feeling.  He endorses associated abnormal sensation in his chest that he localizes to the mid sternum that occasionally radiates across both sides of the chest and he describes it as \"pinching\".  He is not currently experiencing this symptom now.  He denies any ear pain, head pain, abdominal pain.    Medications: Denies any  Soc HX: Patient is a twin  RX Allergies[1]: NKDA    Physical Exam  Vitals and nursing note reviewed.   Constitutional:       General: He is not in acute distress.     Appearance: Normal appearance. He is not ill-appearing or toxic-appearing.      Comments: Elevated BMI   HENT:      Right Ear: Tympanic membrane, ear canal and external ear normal. No middle ear effusion. Tympanic membrane is not erythematous.      Left Ear: Tympanic membrane, ear canal and external ear normal. Tympanic membrane is not erythematous.      Nose: Congestion present. No rhinorrhea.      Mouth/Throat:      Mouth: Mucous membranes are moist.      Pharynx: Uvula midline. No posterior oropharyngeal erythema or uvula swelling.      Tonsils: No tonsillar exudate " "or tonsillar abscesses. 0 on the right. 0 on the left.   Eyes:      Pupils: Pupils are equal, round, and reactive to light.   Neck:        Comments: Difficult to assess for thyromegaly or adenopathy secondary to body habitus  Cardiovascular:      Rate and Rhythm: Normal rate and regular rhythm.      Pulses: Normal pulses.      Heart sounds: Normal heart sounds.   Pulmonary:      Effort: Pulmonary effort is normal. No respiratory distress.      Breath sounds: Normal breath sounds.   Abdominal:      General: Bowel sounds are normal.      Palpations: Abdomen is soft.      Tenderness: There is no abdominal tenderness. There is no guarding or rebound.   Musculoskeletal:         General: No deformity or signs of injury. Normal range of motion.      Cervical back: Normal range of motion.   Skin:     General: Skin is warm and dry.      Capillary Refill: Capillary refill takes less than 2 seconds.      Findings: No bruising.   Neurological:      Mental Status: He is alert.      Cranial Nerves: No cranial nerve deficit.      Comments: CN II through XII grossly intact     VS: As documented in the triage note and EMR flowsheet from this visit were reviewed.    External Records Reviewed: I reviewed recent and relevant outside records including: Reviewed ED provider note 5/26/2025.  Patient seen for an abnormal feeling in his throat.  Uvula midline.  Tonsillar edema on the right.  No he was encouraged to continue OTC medications.  Reviewed ED provider note 5/31/2025.  Patient again seen for \"L[place] apple swollen\" and sore throat.  X-ray soft tissue showed no evidence of swelling or epiglottic thickening he is given Tylenol Motrin declined lidocaine solution      Medical Decision Making:   ED Course as of 06/09/25 2322   Mon Jun 09, 2025 2056 Vitals Reviewed: Afebrile. Normotensive. Borderline tachycardic; not tachypneic. No hypoxia.   [KA]   2212 Patient is a 19-year-old male that presents to the ED for evaluation of foreign " body sensation in the neck.  This is his third ED visit for same.  He has had multiple negative strep and COVID test.  Had normal x-ray of the neck.  On exam it is difficult to assess for thyromegaly, masses or adenopathy secondary to body habitus.  Patent oropharynx.  He does have voice changes.  Will obtain CT soft tissue of the neck to rule out any underlying mass, abnormality.  Considered EOE because he says he is also experiencing midsternal pain occasionally that is not present now.  However he has not identified any foods that trigger it nor has he had any weight loss.  Explained to patient that if CT imaging today is negative he need to follow-up with ENT.  Have ordered labs and soft tissue neck with IV contrast.  He is not endorsing any pain and does not necessitate any medicine for pain.  I have personally viewed labs.  CBC without abnormalities.  CMP shows mildly hemolyzed potassium of 4.5, bicarb of 18 otherwise normal kidney function normal LFTs. [KA]   2316 IMPRESSION:  No evidence of significant cervical adenopathy or a soft tissue mass  in the neck.      No evidence of radiopaque foreign body.   [KA]   2322 Discussed results with patient.  Recommended he stop using all nasal sprays.  Discussed other possible causes of dysphagia.  Advised to follow-up with ENT.  Have placed referral.  Encouraged him to return to ED for new or worsening symptoms. [KA]      ED Course User Index  [KA] Steph Romero PA-C         Diagnoses as of 06/09/25 2322   Dysphagia, unspecified type      Escalation of Care: Appropriate for outpatient management   Counseling: Spoke with the patient and discussed today´s findings, in addition to providing specific details for the plan of care and expected course.  Patient was given the opportunity to ask questions.    Discussed return precautions and importance of follow-up.  Advised to follow-up with ENT.  Advised to return to the ED for changing or worsening symptoms, new  symptoms, complaint specific precautions, and precautions listed on the discharge paperwork.  Educated on the common potential side effects of medications prescribed.    I advised the patient that the emergency evaluation and treatment provided today doesn't end their need for medical care. It is very important that they follow-up with their primary care provider or other specialist as instructed.    The plan of care was mutually agreed upon with the patient. The patient and/or family were given the opportunity to ask questions. All questions asked today in the ED were answered to the best of my ability with today's information.    I specifically advised the patient to return to the ED for changing or worsening symptoms, worrisome new symptoms, or for any complaint specific precautions listed on the discharge paperwork.    This patient was cared for in the setting of nationwide stress on resources and staffing.    This report was transcribed using voice recognition software.  Every effort was made to ensure accuracy, however, inadvertently computerized transcription errors may be present.         [1] No Known Allergies       Steph Romero PA-C  06/09/25 3072

## 2025-06-10 NOTE — ED TRIAGE NOTES
"Pt from private vehicle c/c of sore throat x2weeks. Pt states \"my rivera apple is moving a lot\" can tolerate serge and PO    Pt A&Ox3, pt alert calm cooperative with care. Pt resp even and unlabored.     PMH: n/a    "

## 2025-06-10 NOTE — DISCHARGE INSTRUCTIONS
CT of your neck does not show any abnormalities.  Thyroid is normal.  There are no masses.  No visible cancer.  No abscesses.  Nothing that would explain your symptoms.  Please stop using all nasal sprays.  Please follow-up with ENT.  Referral has been placed for you to establish care.  If you develop any new or worsening symptoms or feel he need to be seen in the ER again please go to [facility] emergency department where they have ENT on-call 24/7.

## 2025-06-19 ENCOUNTER — APPOINTMENT (OUTPATIENT)
Facility: CLINIC | Age: 20
End: 2025-06-19
Payer: MEDICAID

## 2025-07-22 ENCOUNTER — APPOINTMENT (OUTPATIENT)
Facility: CLINIC | Age: 20
End: 2025-07-22
Payer: MEDICAID

## 2025-08-05 ENCOUNTER — HOSPITAL ENCOUNTER (EMERGENCY)
Facility: HOSPITAL | Age: 20
Discharge: HOME | End: 2025-08-05
Attending: EMERGENCY MEDICINE
Payer: MEDICAID

## 2025-08-05 VITALS
RESPIRATION RATE: 18 BRPM | BODY MASS INDEX: 36.99 KG/M2 | WEIGHT: 222 LBS | DIASTOLIC BLOOD PRESSURE: 86 MMHG | HEIGHT: 65 IN | TEMPERATURE: 97.7 F | HEART RATE: 78 BPM | OXYGEN SATURATION: 96 % | SYSTOLIC BLOOD PRESSURE: 146 MMHG

## 2025-08-05 DIAGNOSIS — R19.8 TONGUE SYMPTOM: Primary | ICD-10-CM

## 2025-08-05 PROCEDURE — 2500000004 HC RX 250 GENERAL PHARMACY W/ HCPCS (ALT 636 FOR OP/ED): Mod: JZ

## 2025-08-05 PROCEDURE — 99284 EMERGENCY DEPT VISIT MOD MDM: CPT | Performed by: EMERGENCY MEDICINE

## 2025-08-05 RX ORDER — DIPHENHYDRAMINE HYDROCHLORIDE 50 MG/ML
INJECTION, SOLUTION INTRAMUSCULAR; INTRAVENOUS
Status: COMPLETED
Start: 2025-08-05 | End: 2025-08-05

## 2025-08-05 RX ORDER — FAMOTIDINE 10 MG/ML
40 INJECTION, SOLUTION INTRAVENOUS ONCE
Status: COMPLETED | OUTPATIENT
Start: 2025-08-05 | End: 2025-08-05

## 2025-08-05 RX ORDER — FAMOTIDINE 10 MG/ML
INJECTION, SOLUTION INTRAVENOUS
Status: COMPLETED
Start: 2025-08-05 | End: 2025-08-05

## 2025-08-05 RX ORDER — DIPHENHYDRAMINE HYDROCHLORIDE 50 MG/ML
25 INJECTION, SOLUTION INTRAMUSCULAR; INTRAVENOUS ONCE
Status: COMPLETED | OUTPATIENT
Start: 2025-08-05 | End: 2025-08-05

## 2025-08-05 RX ADMIN — DIPHENHYDRAMINE HYDROCHLORIDE 25 MG: 50 INJECTION, SOLUTION INTRAMUSCULAR; INTRAVENOUS at 16:38

## 2025-08-05 RX ADMIN — FAMOTIDINE 40 MG: 10 INJECTION, SOLUTION INTRAVENOUS at 16:42

## 2025-08-05 RX ADMIN — METHYLPREDNISOLONE SODIUM SUCCINATE 125 MG: 125 INJECTION, POWDER, FOR SOLUTION INTRAMUSCULAR; INTRAVENOUS at 16:40

## 2025-08-05 ASSESSMENT — PAIN - FUNCTIONAL ASSESSMENT: PAIN_FUNCTIONAL_ASSESSMENT: 0-10

## 2025-08-05 ASSESSMENT — PAIN SCALES - GENERAL: PAINLEVEL_OUTOF10: 0 - NO PAIN

## 2025-08-05 NOTE — ED PROVIDER NOTES
HPI   Chief Complaint   Patient presents with    Oral Swelling       19-year-old male with no medical history who is presenting with feeling his tongue is swelling up.  States that he was eating pizza about 45 minutes prior to arrival, it was a pepperoni pizza no fruit on it, no known food allergies, for like his tongue started to get bigger.  No trouble swallowing, has never had a reaction like this before.  Mom gave him Zyrtec prior to arrival.  No wheezing, vomiting, abdominal pain, hives.              Patient History   Medical History[1]  Surgical History[2]  Family History[3]  Social History[4]    Physical Exam   ED Triage Vitals [08/05/25 1613]   Temperature Heart Rate Respirations BP   36.5 °C (97.7 °F) 95 18 149/80      Pulse Ox Temp Source Heart Rate Source Patient Position   97 % Oral Monitor Lying      BP Location FiO2 (%)     Right arm --       Physical Exam  Vitals and nursing note reviewed.   Constitutional:       General: He is not in acute distress.     Appearance: Normal appearance.   HENT:      Head: Normocephalic.      Comments: No angioedema, tongue is normal size, no uvular edema     Mouth/Throat:      Mouth: Mucous membranes are moist.     Eyes:      Extraocular Movements: Extraocular movements intact.      Pupils: Pupils are equal, round, and reactive to light.       Cardiovascular:      Rate and Rhythm: Normal rate and regular rhythm.      Pulses: Normal pulses.      Heart sounds: Normal heart sounds.   Pulmonary:      Effort: Pulmonary effort is normal.      Breath sounds: Normal breath sounds.   Abdominal:      General: Abdomen is flat.      Tenderness: There is no abdominal tenderness. There is no right CVA tenderness or left CVA tenderness.     Musculoskeletal:         General: Normal range of motion.      Cervical back: Normal range of motion and neck supple.      Right lower leg: No edema.      Left lower leg: No edema.     Skin:     General: Skin is warm.      Capillary Refill:  Capillary refill takes less than 2 seconds.      Findings: No bruising.     Neurological:      General: No focal deficit present.      Mental Status: He is alert and oriented to person, place, and time. Mental status is at baseline.      Sensory: No sensory deficit.      Motor: No weakness.      Coordination: Coordination normal.     Psychiatric:         Mood and Affect: Mood normal.         Thought Content: Thought content normal.           ED Course & MDM   Diagnoses as of 08/07/25 6973   Tongue symptom                 No data recorded                                 Medical Decision Making  19-year-old male who is presenting with feeling like his tongue is swelling.  On exam he has no evidence of angioedema.  However given the symptoms I did give him medication for an allergic reaction.  He was observed in the ER for over 2 hours, he feels completely asymptomatic at this point and was tolerating p.o.  He given that he does not have any evidence of anaphylaxis will defer EpiPen prescription.  I discharged him with good return precautions.        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    Otitis media, unspecified, unspecified ear     Chronic otitis media    Personal history of other mental and behavioral disorders 11/14/2017    History of attention deficit hyperactivity disorder (ADHD)    Snoring     Snoring    Unspecified lack of expected normal physiological development in childhood     Developmental delay    Unspecified mood (affective) disorder 01/17/2019    Mood disorder   [2]   Past Surgical History:  Procedure Laterality Date    ADENOIDECTOMY  09/17/2017    Adenoidectomy    CIRCUMCISION, PRIMARY  02/16/2016     Elective Circumcision    OTHER SURGICAL HISTORY  12/10/2013    Ear Pressure Equalization Tube, Insertion    OTHER SURGICAL HISTORY  05/24/2020    Tonsillectomy with adenoidectomy    OTHER SURGICAL HISTORY  09/17/2017    Treatment Of The Left Foot    OTHER SURGICAL HISTORY  09/17/2017    Treatment Of The Right Foot   [3] No family history on file.  [4]   Social History  Tobacco Use    Smoking status: Never    Smokeless tobacco: Never   Substance Use Topics    Alcohol use: Never    Drug use: Not Currently        Benjamin Chaudhary MD  08/07/25 3813

## 2025-08-05 NOTE — ED TRIAGE NOTES
PT TO ED FOR TONGUE SWELLING. PT STATES THAT HE WAS EATING EATING A PIZZA WHEN HE BEGAN TO FEEL HIS TONGUE SWELL UP. PT STATES THAT HIS SWELLING HAS SINCE GONE DOWN BUT IT FEELS UNCOMFORTABLE WHEN HE TALKS. PT O2 95% ON RA BUT DENIES SOB OR FEELING ITCHY.

## 2025-08-05 NOTE — DISCHARGE INSTRUCTIONS
Please follow-up with your primary care doctor to determine if you need allergy testing.  Please return to the emergency department if you start to have increased swelling in your tongue, feeling like you are choking, you cannot breathe, or you have any new or concerning symptoms.

## 2025-08-12 ENCOUNTER — APPOINTMENT (OUTPATIENT)
Facility: CLINIC | Age: 20
End: 2025-08-12
Payer: MEDICAID

## 2025-09-02 ENCOUNTER — APPOINTMENT (OUTPATIENT)
Facility: CLINIC | Age: 20
End: 2025-09-02
Payer: MEDICAID